# Patient Record
Sex: FEMALE | Race: WHITE
[De-identification: names, ages, dates, MRNs, and addresses within clinical notes are randomized per-mention and may not be internally consistent; named-entity substitution may affect disease eponyms.]

---

## 2020-07-07 ENCOUNTER — HOSPITAL ENCOUNTER (INPATIENT)
Dept: HOSPITAL 90 - EDH | Age: 80
LOS: 14 days | Discharge: HOSPICE | DRG: 853 | End: 2020-07-21
Attending: INTERNAL MEDICINE | Admitting: INTERNAL MEDICINE
Payer: MEDICARE

## 2020-07-07 VITALS — HEIGHT: 67 IN | BODY MASS INDEX: 36.96 KG/M2 | WEIGHT: 235.5 LBS

## 2020-07-07 DIAGNOSIS — E66.9: ICD-10-CM

## 2020-07-07 DIAGNOSIS — F02.81: ICD-10-CM
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
## 2020-07-07 DIAGNOSIS — I12.9: ICD-10-CM

## 2020-07-07 DIAGNOSIS — K59.00: ICD-10-CM

## 2020-07-07 DIAGNOSIS — Z87.891: ICD-10-CM

## 2020-07-07 DIAGNOSIS — Z90.710: ICD-10-CM

## 2020-07-07 DIAGNOSIS — N39.0: ICD-10-CM

## 2020-07-07 DIAGNOSIS — Z74.01: ICD-10-CM

## 2020-07-07 DIAGNOSIS — R65.20: ICD-10-CM

## 2020-07-07 DIAGNOSIS — E11.22: ICD-10-CM

## 2020-07-07 DIAGNOSIS — E86.0: ICD-10-CM

## 2020-07-07 DIAGNOSIS — K55.20: ICD-10-CM

## 2020-07-07 DIAGNOSIS — N18.9: ICD-10-CM

## 2020-07-07 DIAGNOSIS — S72.401A: ICD-10-CM

## 2020-07-07 DIAGNOSIS — K63.3: ICD-10-CM

## 2020-07-07 DIAGNOSIS — G93.41: ICD-10-CM

## 2020-07-07 DIAGNOSIS — R47.01: ICD-10-CM

## 2020-07-07 DIAGNOSIS — Z82.0: ICD-10-CM

## 2020-07-07 DIAGNOSIS — E87.6: ICD-10-CM

## 2020-07-07 DIAGNOSIS — Z88.8: ICD-10-CM

## 2020-07-07 DIAGNOSIS — Z16.24: ICD-10-CM

## 2020-07-07 DIAGNOSIS — Z20.828: ICD-10-CM

## 2020-07-07 DIAGNOSIS — E78.5: ICD-10-CM

## 2020-07-07 DIAGNOSIS — Z83.3: ICD-10-CM

## 2020-07-07 DIAGNOSIS — A41.51: Primary | ICD-10-CM

## 2020-07-07 DIAGNOSIS — D62: ICD-10-CM

## 2020-07-07 DIAGNOSIS — G30.9: ICD-10-CM

## 2020-07-07 DIAGNOSIS — R29.810: ICD-10-CM

## 2020-07-07 DIAGNOSIS — F02.80: ICD-10-CM
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
## 2020-07-07 DIAGNOSIS — G20: ICD-10-CM

## 2020-07-07 DIAGNOSIS — R13.10: ICD-10-CM

## 2020-07-07 DIAGNOSIS — E87.0: ICD-10-CM

## 2020-07-07 DIAGNOSIS — Z91.048: ICD-10-CM

## 2020-07-07 DIAGNOSIS — Z16.12: ICD-10-CM

## 2020-07-07 DIAGNOSIS — Z82.3: ICD-10-CM

## 2020-07-07 DIAGNOSIS — Z86.73: ICD-10-CM
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
## 2020-07-07 DIAGNOSIS — R53.81: ICD-10-CM

## 2020-07-07 DIAGNOSIS — J18.9: ICD-10-CM
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
## 2020-07-07 DIAGNOSIS — Z82.49: ICD-10-CM

## 2020-07-07 DIAGNOSIS — Z82.5: ICD-10-CM

## 2020-07-07 DIAGNOSIS — D64.9: ICD-10-CM

## 2020-07-07 LAB
ALBUMIN SERPL-MCNC: 3 G/DL (ref 3.5–5)
ALT SERPL-CCNC: 22 U/L (ref 12–78)
APTT PPP: 26.9 SEC (ref 26.3–35.5)
AST SERPL-CCNC: 20 U/L (ref 10–37)
BASOPHILS NFR BLD AUTO: 0.2 % (ref 0–5)
BILIRUB SERPL-MCNC: 0.4 MG/DL (ref 0.2–1)
BUN SERPL-MCNC: 30 MG/DL (ref 7–18)
CHLORIDE SERPL-SCNC: 112 MMOL/L (ref 101–111)
CO2 SERPL-SCNC: 34 MMOL/L (ref 21–32)
CREAT SERPL-MCNC: 1.2 MG/DL (ref 0.5–1.5)
EOSINOPHIL NFR BLD AUTO: 0 % (ref 0–8)
ERYTHROCYTE [DISTWIDTH] IN BLOOD BY AUTOMATED COUNT: 13.6 % (ref 11–15.5)
GFR SERPL CREATININE-BSD FRML MDRD: 46 ML/MIN (ref 60–?)
GLUCOSE SERPL-MCNC: 372 MG/DL (ref 70–105)
GLUCOSE UR STRIP-MCNC: 500 MG/DL
HCT VFR BLD AUTO: 27.7 % (ref 36–48)
INR PPP: 0.99 (ref 0.85–1.15)
LYMPHOCYTES NFR SPEC AUTO: 8 % (ref 21–51)
MCH RBC QN AUTO: 31.6 PG (ref 27–33)
MCHC RBC AUTO-ENTMCNC: 32.1 G/DL (ref 32–36)
MCV RBC AUTO: 98.2 FL (ref 79–99)
MONOCYTES NFR BLD AUTO: 9.7 % (ref 3–13)
NEUTROPHILS NFR BLD AUTO: 81.5 % (ref 40–77)
NRBC BLD MANUAL-RTO: 0 % (ref 0–0.19)
PH UR STRIP: 5.5 [PH] (ref 5–8)
PLATELET # BLD AUTO: 239 K/UL (ref 130–400)
POTASSIUM SERPL-SCNC: 3.4 MMOL/L (ref 3.5–5.1)
PROT SERPL-MCNC: 6.7 G/DL (ref 6–8.3)
PROT UR QL STRIP: (no result) MG/DL
PROTHROMBIN TIME: 10.7 SEC (ref 9.6–11.6)
RBC # BLD AUTO: 2.82 MIL/UL (ref 4–5.5)
RBC #/AREA URNS HPF: (no result) /HPF (ref 0–1)
SODIUM SERPL-SCNC: 153 MMOL/L (ref 136–145)
SP GR UR STRIP: 1.03 (ref 1–1.03)
UROBILINOGEN UR STRIP-MCNC: 1 MG/DL (ref 0.2–1)
WBC # BLD AUTO: 16 K/UL (ref 4.8–10.8)

## 2020-07-07 PROCEDURE — 85014 HEMATOCRIT: CPT

## 2020-07-07 PROCEDURE — 93356 MYOCRD STRAIN IMG SPCKL TRCK: CPT

## 2020-07-07 PROCEDURE — 92610 EVALUATE SWALLOWING FUNCTION: CPT

## 2020-07-07 PROCEDURE — 81001 URINALYSIS AUTO W/SCOPE: CPT

## 2020-07-07 PROCEDURE — 80202 ASSAY OF VANCOMYCIN: CPT

## 2020-07-07 PROCEDURE — 93306 TTE W/DOPPLER COMPLETE: CPT

## 2020-07-07 PROCEDURE — 86850 RBC ANTIBODY SCREEN: CPT

## 2020-07-07 PROCEDURE — 85027 COMPLETE CBC AUTOMATED: CPT

## 2020-07-07 PROCEDURE — 92526 ORAL FUNCTION THERAPY: CPT

## 2020-07-07 PROCEDURE — 86922 COMPATIBILITY TEST ANTIGLOB: CPT

## 2020-07-07 PROCEDURE — 80053 COMPREHEN METABOLIC PANEL: CPT

## 2020-07-07 PROCEDURE — 85610 PROTHROMBIN TIME: CPT

## 2020-07-07 PROCEDURE — 86900 BLOOD TYPING SEROLOGIC ABO: CPT

## 2020-07-07 PROCEDURE — 36415 COLL VENOUS BLD VENIPUNCTURE: CPT

## 2020-07-07 PROCEDURE — 73552 X-RAY EXAM OF FEMUR 2/>: CPT

## 2020-07-07 PROCEDURE — 87186 SC STD MICRODIL/AGAR DIL: CPT

## 2020-07-07 PROCEDURE — 93005 ELECTROCARDIOGRAM TRACING: CPT

## 2020-07-07 PROCEDURE — 87077 CULTURE AEROBIC IDENTIFY: CPT

## 2020-07-07 PROCEDURE — 85730 THROMBOPLASTIN TIME PARTIAL: CPT

## 2020-07-07 PROCEDURE — 82010 KETONE BODYS QUAN: CPT

## 2020-07-07 PROCEDURE — 86140 C-REACTIVE PROTEIN: CPT

## 2020-07-07 PROCEDURE — 36430 TRANSFUSION BLD/BLD COMPNT: CPT

## 2020-07-07 PROCEDURE — 85018 HEMOGLOBIN: CPT

## 2020-07-07 PROCEDURE — 73521 X-RAY EXAM HIPS BI 2 VIEWS: CPT

## 2020-07-07 PROCEDURE — 82270 OCCULT BLOOD FECES: CPT

## 2020-07-07 PROCEDURE — 80048 BASIC METABOLIC PNL TOTAL CA: CPT

## 2020-07-07 PROCEDURE — 87040 BLOOD CULTURE FOR BACTERIA: CPT

## 2020-07-07 PROCEDURE — 82948 REAGENT STRIP/BLOOD GLUCOSE: CPT

## 2020-07-07 PROCEDURE — 84145 PROCALCITONIN (PCT): CPT

## 2020-07-07 PROCEDURE — 85025 COMPLETE CBC W/AUTO DIFF WBC: CPT

## 2020-07-07 PROCEDURE — 73590 X-RAY EXAM OF LOWER LEG: CPT

## 2020-07-07 PROCEDURE — 71045 X-RAY EXAM CHEST 1 VIEW: CPT

## 2020-07-07 PROCEDURE — 83605 ASSAY OF LACTIC ACID: CPT

## 2020-07-07 PROCEDURE — 87088 URINE BACTERIA CULTURE: CPT

## 2020-07-07 PROCEDURE — 86901 BLOOD TYPING SEROLOGIC RH(D): CPT

## 2020-07-08 VITALS — DIASTOLIC BLOOD PRESSURE: 86 MMHG | SYSTOLIC BLOOD PRESSURE: 138 MMHG

## 2020-07-08 VITALS — DIASTOLIC BLOOD PRESSURE: 70 MMHG | SYSTOLIC BLOOD PRESSURE: 125 MMHG

## 2020-07-08 VITALS — DIASTOLIC BLOOD PRESSURE: 54 MMHG | SYSTOLIC BLOOD PRESSURE: 133 MMHG

## 2020-07-08 VITALS — DIASTOLIC BLOOD PRESSURE: 75 MMHG | SYSTOLIC BLOOD PRESSURE: 131 MMHG

## 2020-07-08 VITALS — DIASTOLIC BLOOD PRESSURE: 46 MMHG | SYSTOLIC BLOOD PRESSURE: 105 MMHG

## 2020-07-08 VITALS — SYSTOLIC BLOOD PRESSURE: 119 MMHG | DIASTOLIC BLOOD PRESSURE: 46 MMHG

## 2020-07-08 LAB
BASOPHILS NFR BLD AUTO: 0.2 % (ref 0–5)
BUN SERPL-MCNC: 27 MG/DL (ref 7–18)
CHLORIDE SERPL-SCNC: 116 MMOL/L (ref 101–111)
CO2 SERPL-SCNC: 31 MMOL/L (ref 21–32)
CREAT SERPL-MCNC: 1 MG/DL (ref 0.5–1.5)
EOSINOPHIL NFR BLD AUTO: 0 % (ref 0–8)
ERYTHROCYTE [DISTWIDTH] IN BLOOD BY AUTOMATED COUNT: 13.8 % (ref 11–15.5)
GFR SERPL CREATININE-BSD FRML MDRD: 56 ML/MIN (ref 60–?)
GLUCOSE SERPL-MCNC: 183 MG/DL (ref 70–105)
HCT VFR BLD AUTO: 22.8 % (ref 36–48)
LYMPHOCYTES NFR SPEC AUTO: 8.4 % (ref 21–51)
MCH RBC QN AUTO: 31.2 PG (ref 27–33)
MCHC RBC AUTO-ENTMCNC: 31.6 G/DL (ref 32–36)
MCV RBC AUTO: 98.7 FL (ref 79–99)
MONOCYTES NFR BLD AUTO: 11.6 % (ref 3–13)
NEUTROPHILS NFR BLD AUTO: 79.2 % (ref 40–77)
NRBC BLD MANUAL-RTO: 0 % (ref 0–0.19)
PLATELET # BLD AUTO: 194 K/UL (ref 130–400)
POTASSIUM SERPL-SCNC: 3 MMOL/L (ref 3.5–5.1)
RBC # BLD AUTO: 2.31 MIL/UL (ref 4–5.5)
SODIUM SERPL-SCNC: 155 MMOL/L (ref 136–145)
WBC # BLD AUTO: 19.5 K/UL (ref 4.8–10.8)

## 2020-07-08 RX ADMIN — PIPERACILLIN SODIUM AND TAZOBACTAM SODIUM SCH MLS/HR: .375; 3 INJECTION, POWDER, LYOPHILIZED, FOR SOLUTION INTRAVENOUS at 12:06

## 2020-07-08 RX ADMIN — POTASSIUM CHLORIDE PRN MLS/HR: 10 INJECTION, SOLUTION INTRAVENOUS at 12:06

## 2020-07-08 RX ADMIN — FAMOTIDINE SCH MG: 10 INJECTION INTRAVENOUS at 08:31

## 2020-07-08 RX ADMIN — Medication SCH MLS/HR: at 21:27

## 2020-07-08 RX ADMIN — MORPHINE SULFATE PRN MG: 2 INJECTION, SOLUTION INTRAMUSCULAR; INTRAVENOUS at 02:08

## 2020-07-08 RX ADMIN — SODIUM CHLORIDE SCH UNIT: 9 INJECTION, SOLUTION INTRAVENOUS at 17:24

## 2020-07-08 RX ADMIN — POTASSIUM CHLORIDE PRN MLS/HR: 10 INJECTION, SOLUTION INTRAVENOUS at 05:28

## 2020-07-08 RX ADMIN — PIPERACILLIN SODIUM AND TAZOBACTAM SODIUM SCH MLS/HR: .375; 3 INJECTION, POWDER, LYOPHILIZED, FOR SOLUTION INTRAVENOUS at 21:27

## 2020-07-08 RX ADMIN — SODIUM CHLORIDE SCH UNIT: 9 INJECTION, SOLUTION INTRAVENOUS at 11:56

## 2020-07-08 RX ADMIN — LIDOCAINE HYDROCHLORIDE PRN ML: 10 INJECTION, SOLUTION EPIDURAL; INFILTRATION; INTRACAUDAL; PERINEURAL at 12:07

## 2020-07-08 RX ADMIN — Medication SCH MLS/HR: at 02:07

## 2020-07-08 RX ADMIN — SODIUM CHLORIDE SCH UNIT: 9 INJECTION, SOLUTION INTRAVENOUS at 06:08

## 2020-07-08 RX ADMIN — SODIUM CHLORIDE SCH UNIT: 9 INJECTION, SOLUTION INTRAVENOUS at 00:00

## 2020-07-08 NOTE — NUR
[facility]

CM called pt spouse on facesheet, left voicemail to Farrukh Conte [phone], pending 
call back. Obtained info from previous admi records. Pt is bedbound/dependent, lives at home 
with spouse. Pt has a hospital bed w/air mattress, liss lift, walker, wheelchair, shower 
chair, active w/[facility] 3x/wk. Been to [facility] in the past. DC plan back to home vs SNF, 
pending spouse and pt to decide. CM to cont to follow up.

-------------------------------------------------------------------------------

Addendum: 07/08/20 at 1055 by ELIF TIRADO LVN 

-------------------------------------------------------------------------------

Amended: Links added.

## 2020-07-08 NOTE — NUR
FAMILY UPDATE

SANTIAGO PERSONAL SITTER AT BEDSIDE UPDATED WITH STATUS AND PLAN OF CARE, STATES SHE IS CONTACT 
WITH FAMILY, NO QUESTIONS AT THIS TIME.

## 2020-07-09 VITALS — SYSTOLIC BLOOD PRESSURE: 122 MMHG | DIASTOLIC BLOOD PRESSURE: 64 MMHG

## 2020-07-09 VITALS — DIASTOLIC BLOOD PRESSURE: 58 MMHG | SYSTOLIC BLOOD PRESSURE: 132 MMHG

## 2020-07-09 VITALS — SYSTOLIC BLOOD PRESSURE: 134 MMHG | DIASTOLIC BLOOD PRESSURE: 54 MMHG

## 2020-07-09 VITALS — DIASTOLIC BLOOD PRESSURE: 56 MMHG | SYSTOLIC BLOOD PRESSURE: 130 MMHG

## 2020-07-09 VITALS — SYSTOLIC BLOOD PRESSURE: 134 MMHG | DIASTOLIC BLOOD PRESSURE: 69 MMHG

## 2020-07-09 VITALS — SYSTOLIC BLOOD PRESSURE: 132 MMHG | DIASTOLIC BLOOD PRESSURE: 64 MMHG

## 2020-07-09 VITALS — SYSTOLIC BLOOD PRESSURE: 124 MMHG | DIASTOLIC BLOOD PRESSURE: 42 MMHG

## 2020-07-09 LAB
ALBUMIN SERPL-MCNC: 2.2 G/DL (ref 3.5–5)
ALT SERPL-CCNC: 18 U/L (ref 12–78)
AST SERPL-CCNC: 22 U/L (ref 10–37)
BASOPHILS NFR BLD AUTO: 0.1 % (ref 0–5)
BILIRUB SERPL-MCNC: 0.3 MG/DL (ref 0.2–1)
BUN SERPL-MCNC: 24 MG/DL (ref 7–18)
CHLORIDE SERPL-SCNC: 112 MMOL/L (ref 101–111)
CO2 SERPL-SCNC: 30 MMOL/L (ref 21–32)
CREAT SERPL-MCNC: 0.9 MG/DL (ref 0.5–1.5)
EOSINOPHIL NFR BLD AUTO: 0.4 % (ref 0–8)
ERYTHROCYTE [DISTWIDTH] IN BLOOD BY AUTOMATED COUNT: 13.8 % (ref 11–15.5)
GFR SERPL CREATININE-BSD FRML MDRD: 64 ML/MIN (ref 60–?)
GLUCOSE SERPL-MCNC: 330 MG/DL (ref 70–105)
HCT VFR BLD AUTO: 19.1 % (ref 36–48)
LYMPHOCYTES NFR SPEC AUTO: 7.9 % (ref 21–51)
MCH RBC QN AUTO: 31.3 PG (ref 27–33)
MCHC RBC AUTO-ENTMCNC: 31.4 G/DL (ref 32–36)
MCV RBC AUTO: 99.5 FL (ref 79–99)
MONOCYTES NFR BLD AUTO: 8.3 % (ref 3–13)
NEUTROPHILS NFR BLD AUTO: 82.2 % (ref 40–77)
NRBC BLD MANUAL-RTO: 0 % (ref 0–0.19)
PLATELET # BLD AUTO: 178 K/UL (ref 130–400)
POTASSIUM SERPL-SCNC: 3.6 MMOL/L (ref 3.5–5.1)
PROT SERPL-MCNC: 5.8 G/DL (ref 6–8.3)
RBC # BLD AUTO: 1.92 MIL/UL (ref 4–5.5)
SODIUM SERPL-SCNC: 150 MMOL/L (ref 136–145)
WBC # BLD AUTO: 15.2 K/UL (ref 4.8–10.8)

## 2020-07-09 RX ADMIN — MORPHINE SULFATE PRN MG: 2 INJECTION, SOLUTION INTRAMUSCULAR; INTRAVENOUS at 05:34

## 2020-07-09 RX ADMIN — PIPERACILLIN SODIUM AND TAZOBACTAM SODIUM SCH MLS/HR: .375; 3 INJECTION, POWDER, LYOPHILIZED, FOR SOLUTION INTRAVENOUS at 12:23

## 2020-07-09 RX ADMIN — SODIUM CHLORIDE SCH UNIT: 9 INJECTION, SOLUTION INTRAVENOUS at 07:56

## 2020-07-09 RX ADMIN — FAMOTIDINE SCH MG: 10 INJECTION INTRAVENOUS at 10:28

## 2020-07-09 RX ADMIN — SODIUM CHLORIDE SCH UNIT: 9 INJECTION, SOLUTION INTRAVENOUS at 00:00

## 2020-07-09 RX ADMIN — SODIUM CHLORIDE SCH UNIT: 9 INJECTION, SOLUTION INTRAVENOUS at 20:48

## 2020-07-09 RX ADMIN — PIPERACILLIN SODIUM AND TAZOBACTAM SODIUM SCH MLS/HR: .375; 3 INJECTION, POWDER, LYOPHILIZED, FOR SOLUTION INTRAVENOUS at 19:52

## 2020-07-09 RX ADMIN — SODIUM CHLORIDE SCH UNIT: 9 INJECTION, SOLUTION INTRAVENOUS at 12:24

## 2020-07-09 RX ADMIN — PIPERACILLIN SODIUM AND TAZOBACTAM SODIUM SCH MLS/HR: .375; 3 INJECTION, POWDER, LYOPHILIZED, FOR SOLUTION INTRAVENOUS at 05:01

## 2020-07-09 RX ADMIN — SODIUM CHLORIDE SCH UNIT: 9 INJECTION, SOLUTION INTRAVENOUS at 16:41

## 2020-07-09 NOTE — NUR
spoke with laurie dunn about patient's fever of 101.4, he ordered a suppository tynelol 
650 mg. after i gave it, her temperature was 100.5 and i call him again at 23:20. he ordered 
blood cultures x 2, 500 ml of sodium chloride bolus, chest x-ray stat, lactic acid and 
procalcitonin.

## 2020-07-09 NOTE — NUR
PATIENT UPDATE

Pt's hemoglobin dropped fr 7.2 last night to 6.7 this am, no bleeding noted externally. Lab 
asked to repeat the cbc, at the same time included the type and screen with the labs. Ingrid Hall NP called and was made aware about what's going on with the pt with orders for type 
and screen and Cardiology consult. Pt medicated once with morphine 2 mg slow iv push before 
she was given a bedbath. With expressive aphasia, with a 24 hr private sitter at the 
bedside. Pt on aspiration precautions, head of the bed up at 40 degrees, repositioned in the 
bed every 2 hrs ,rt leg with an immobilizer in place.

## 2020-07-10 VITALS — SYSTOLIC BLOOD PRESSURE: 123 MMHG | DIASTOLIC BLOOD PRESSURE: 63 MMHG

## 2020-07-10 VITALS — DIASTOLIC BLOOD PRESSURE: 71 MMHG | SYSTOLIC BLOOD PRESSURE: 128 MMHG

## 2020-07-10 VITALS — SYSTOLIC BLOOD PRESSURE: 137 MMHG | DIASTOLIC BLOOD PRESSURE: 53 MMHG

## 2020-07-10 VITALS — DIASTOLIC BLOOD PRESSURE: 59 MMHG | SYSTOLIC BLOOD PRESSURE: 119 MMHG

## 2020-07-10 VITALS — SYSTOLIC BLOOD PRESSURE: 129 MMHG | DIASTOLIC BLOOD PRESSURE: 68 MMHG

## 2020-07-10 LAB
ALBUMIN SERPL-MCNC: 2 G/DL (ref 3.5–5)
ALT SERPL-CCNC: 14 U/L (ref 12–78)
AST SERPL-CCNC: 16 U/L (ref 10–37)
BASOPHILS NFR BLD AUTO: 0.1 % (ref 0–5)
BILIRUB SERPL-MCNC: 0.6 MG/DL (ref 0.2–1)
BUN SERPL-MCNC: 20 MG/DL (ref 7–18)
CHLORIDE SERPL-SCNC: 114 MMOL/L (ref 101–111)
CO2 SERPL-SCNC: 30 MMOL/L (ref 21–32)
CREAT SERPL-MCNC: 0.9 MG/DL (ref 0.5–1.5)
EOSINOPHIL NFR BLD AUTO: 0.6 % (ref 0–8)
ERYTHROCYTE [DISTWIDTH] IN BLOOD BY AUTOMATED COUNT: 14.6 % (ref 11–15.5)
GFR SERPL CREATININE-BSD FRML MDRD: 64 ML/MIN (ref 60–?)
GLUCOSE SERPL-MCNC: 253 MG/DL (ref 70–105)
HCT VFR BLD AUTO: 23.7 % (ref 36–48)
LYMPHOCYTES NFR SPEC AUTO: 5.4 % (ref 21–51)
MCH RBC QN AUTO: 30.5 PG (ref 27–33)
MCHC RBC AUTO-ENTMCNC: 31.6 G/DL (ref 32–36)
MCV RBC AUTO: 96.3 FL (ref 79–99)
MONOCYTES NFR BLD AUTO: 6.5 % (ref 3–13)
NEUTROPHILS NFR BLD AUTO: 86.7 % (ref 40–77)
NRBC BLD MANUAL-RTO: 0 % (ref 0–0.19)
PLATELET # BLD AUTO: 190 K/UL (ref 130–400)
POTASSIUM SERPL-SCNC: 3.6 MMOL/L (ref 3.5–5.1)
PROT SERPL-MCNC: 5.9 G/DL (ref 6–8.3)
RBC # BLD AUTO: 2.46 MIL/UL (ref 4–5.5)
SODIUM SERPL-SCNC: 150 MMOL/L (ref 136–145)
WBC # BLD AUTO: 20.2 K/UL (ref 4.8–10.8)

## 2020-07-10 RX ADMIN — SODIUM CHLORIDE SCH UNIT: 9 INJECTION, SOLUTION INTRAVENOUS at 16:42

## 2020-07-10 RX ADMIN — SODIUM CHLORIDE SCH UNIT: 9 INJECTION, SOLUTION INTRAVENOUS at 05:58

## 2020-07-10 RX ADMIN — PIPERACILLIN SODIUM AND TAZOBACTAM SODIUM SCH MLS/HR: .375; 3 INJECTION, POWDER, LYOPHILIZED, FOR SOLUTION INTRAVENOUS at 01:55

## 2020-07-10 RX ADMIN — FAMOTIDINE SCH MG: 10 INJECTION INTRAVENOUS at 08:08

## 2020-07-10 RX ADMIN — PIPERACILLIN SODIUM AND TAZOBACTAM SODIUM SCH MLS/HR: .375; 3 INJECTION, POWDER, LYOPHILIZED, FOR SOLUTION INTRAVENOUS at 11:32

## 2020-07-10 RX ADMIN — PIPERACILLIN SODIUM AND TAZOBACTAM SODIUM SCH MLS/HR: .375; 3 INJECTION, POWDER, LYOPHILIZED, FOR SOLUTION INTRAVENOUS at 19:19

## 2020-07-10 RX ADMIN — SODIUM CHLORIDE SCH UNIT: 9 INJECTION, SOLUTION INTRAVENOUS at 20:48

## 2020-07-11 VITALS — SYSTOLIC BLOOD PRESSURE: 137 MMHG | DIASTOLIC BLOOD PRESSURE: 58 MMHG

## 2020-07-11 VITALS — SYSTOLIC BLOOD PRESSURE: 129 MMHG | DIASTOLIC BLOOD PRESSURE: 51 MMHG

## 2020-07-11 VITALS — DIASTOLIC BLOOD PRESSURE: 57 MMHG | SYSTOLIC BLOOD PRESSURE: 144 MMHG

## 2020-07-11 VITALS — DIASTOLIC BLOOD PRESSURE: 66 MMHG | SYSTOLIC BLOOD PRESSURE: 134 MMHG

## 2020-07-11 VITALS — SYSTOLIC BLOOD PRESSURE: 119 MMHG | DIASTOLIC BLOOD PRESSURE: 71 MMHG

## 2020-07-11 VITALS — DIASTOLIC BLOOD PRESSURE: 67 MMHG | SYSTOLIC BLOOD PRESSURE: 120 MMHG

## 2020-07-11 VITALS — SYSTOLIC BLOOD PRESSURE: 143 MMHG | DIASTOLIC BLOOD PRESSURE: 54 MMHG

## 2020-07-11 LAB
ALBUMIN SERPL-MCNC: 2 G/DL (ref 3.5–5)
ALT SERPL-CCNC: 15 U/L (ref 12–78)
AST SERPL-CCNC: 16 U/L (ref 10–37)
BASOPHILS NFR BLD AUTO: 0.1 % (ref 0–5)
BILIRUB SERPL-MCNC: 0.6 MG/DL (ref 0.2–1)
BUN SERPL-MCNC: 18 MG/DL (ref 7–18)
CHLORIDE SERPL-SCNC: 115 MMOL/L (ref 101–111)
CO2 SERPL-SCNC: 31 MMOL/L (ref 21–32)
CREAT SERPL-MCNC: 0.8 MG/DL (ref 0.5–1.5)
CRP SERPL HS-MCNC: 507.3 MG/L (ref 0–9)
EOSINOPHIL NFR BLD AUTO: 1.6 % (ref 0–8)
ERYTHROCYTE [DISTWIDTH] IN BLOOD BY AUTOMATED COUNT: 14.3 % (ref 11–15.5)
GFR SERPL CREATININE-BSD FRML MDRD: 73 ML/MIN (ref 60–?)
GLUCOSE SERPL-MCNC: 188 MG/DL (ref 70–105)
HCT VFR BLD AUTO: 24.5 % (ref 36–48)
LYMPHOCYTES NFR SPEC AUTO: 4.4 % (ref 21–51)
MCH RBC QN AUTO: 31.2 PG (ref 27–33)
MCHC RBC AUTO-ENTMCNC: 32.2 G/DL (ref 32–36)
MCV RBC AUTO: 96.8 FL (ref 79–99)
MONOCYTES NFR BLD AUTO: 7.8 % (ref 3–13)
NEUTROPHILS NFR BLD AUTO: 85.1 % (ref 40–77)
NRBC BLD MANUAL-RTO: 0.1 % (ref 0–0.19)
PLATELET # BLD AUTO: 228 K/UL (ref 130–400)
POTASSIUM SERPL-SCNC: 3.5 MMOL/L (ref 3.5–5.1)
PROT SERPL-MCNC: 6.2 G/DL (ref 6–8.3)
RBC # BLD AUTO: 2.53 MIL/UL (ref 4–5.5)
SODIUM SERPL-SCNC: 153 MMOL/L (ref 136–145)
WBC # BLD AUTO: 23.9 K/UL (ref 4.8–10.8)

## 2020-07-11 RX ADMIN — PIPERACILLIN SODIUM AND TAZOBACTAM SODIUM SCH MLS/HR: .375; 3 INJECTION, POWDER, LYOPHILIZED, FOR SOLUTION INTRAVENOUS at 19:46

## 2020-07-11 RX ADMIN — POTASSIUM CHLORIDE PRN MEQ: 1.5 SOLUTION ORAL at 18:32

## 2020-07-11 RX ADMIN — SODIUM CHLORIDE SCH UNIT: 9 INJECTION, SOLUTION INTRAVENOUS at 11:41

## 2020-07-11 RX ADMIN — FAMOTIDINE SCH MG: 10 INJECTION INTRAVENOUS at 09:07

## 2020-07-11 RX ADMIN — SODIUM CHLORIDE SCH UNIT: 9 INJECTION, SOLUTION INTRAVENOUS at 16:29

## 2020-07-11 RX ADMIN — SODIUM CHLORIDE SCH MLS/HR: 9 INJECTION, SOLUTION INTRAVENOUS at 13:17

## 2020-07-11 RX ADMIN — SODIUM CHLORIDE SCH UNIT: 9 INJECTION, SOLUTION INTRAVENOUS at 22:22

## 2020-07-11 RX ADMIN — SODIUM CHLORIDE SCH UNIT: 9 INJECTION, SOLUTION INTRAVENOUS at 05:54

## 2020-07-11 RX ADMIN — PIPERACILLIN SODIUM AND TAZOBACTAM SODIUM SCH MLS/HR: .375; 3 INJECTION, POWDER, LYOPHILIZED, FOR SOLUTION INTRAVENOUS at 03:27

## 2020-07-11 RX ADMIN — PIPERACILLIN SODIUM AND TAZOBACTAM SODIUM SCH MLS/HR: .375; 3 INJECTION, POWDER, LYOPHILIZED, FOR SOLUTION INTRAVENOUS at 11:40

## 2020-07-11 RX ADMIN — POTASSIUM CHLORIDE PRN MEQ: 1.5 SOLUTION ORAL at 09:16

## 2020-07-11 NOTE — NUR
Re: New IV Access



Cat RN successfully able to start aseptically a new access x 2 attempt to the left arm, 
failed then to the right breast area close to the nipple, gauge 22, blood re-started at this 
time, at 150 cc/hr via dial a flow. Requested private pay sitter at the bedside to make sure 
pt won't place her hand on top of the right breast as this is where the new IV access is 
located, agreed.

## 2020-07-11 NOTE — NUR
Re: Infiltrated IV Access



Maria D RN charge nurse from CCU brought Cat RN from ED all gear up stated wearing new 
gown, shield, mask,shoe cover as reported will be starting a new IV access on this case.

## 2020-07-11 NOTE — NUR
PM Assessment



Received pt with PRBC unit # [id number] in progress being infused via dial a flow, need 
to stop at this time site noted infiltrated. Attempt to find a new acces, unsuccessful 
together with another nurse Ava RN. Phoned arpan THIBODEAUX stated to asked CCU charge 
nurse Maria D for assistance & to use a vein finder. Phoned Maria D RUST & was made aware 
I need assistance in re- starting the IV access since I have a blood transfusion that need 
to be infuse & be finish by 2130. Per Maria D RUST stated she will be coming to assist.

## 2020-07-11 NOTE — NUR
Re: Last BM



Per private sitter at the bedside Aleah stated last BM on their record was 7/6/2020, attempt 
to give pt slowly Lactulose orally, given only 5cc when pt noted to constantly coughing, 
discontinued the Lactulose. I verified with the sitter if pt does cough when being fed, 
stated since pt got sick this has been happening.I inform the sitter that I will notify MD 
in AM of this issue as we might need to have a swallowing test to make sure pt won't be 
aspirating. Sitter instructed not to give the pt anything orally for now.

## 2020-07-12 VITALS — DIASTOLIC BLOOD PRESSURE: 73 MMHG | SYSTOLIC BLOOD PRESSURE: 139 MMHG

## 2020-07-12 VITALS — DIASTOLIC BLOOD PRESSURE: 71 MMHG | SYSTOLIC BLOOD PRESSURE: 140 MMHG

## 2020-07-12 VITALS — DIASTOLIC BLOOD PRESSURE: 48 MMHG | SYSTOLIC BLOOD PRESSURE: 154 MMHG

## 2020-07-12 VITALS — DIASTOLIC BLOOD PRESSURE: 76 MMHG | SYSTOLIC BLOOD PRESSURE: 137 MMHG

## 2020-07-12 VITALS — DIASTOLIC BLOOD PRESSURE: 44 MMHG | SYSTOLIC BLOOD PRESSURE: 142 MMHG

## 2020-07-12 VITALS — DIASTOLIC BLOOD PRESSURE: 70 MMHG | SYSTOLIC BLOOD PRESSURE: 125 MMHG

## 2020-07-12 LAB
ALBUMIN SERPL-MCNC: 1.8 G/DL (ref 3.5–5)
ALT SERPL-CCNC: 15 U/L (ref 12–78)
AST SERPL-CCNC: 18 U/L (ref 10–37)
BILIRUB SERPL-MCNC: 0.6 MG/DL (ref 0.2–1)
BUN SERPL-MCNC: 19 MG/DL (ref 7–18)
CHLORIDE SERPL-SCNC: 120 MMOL/L (ref 101–111)
CO2 SERPL-SCNC: 29 MMOL/L (ref 21–32)
CREAT SERPL-MCNC: 0.8 MG/DL (ref 0.5–1.5)
CRP SERPL HS-MCNC: 380.1 MG/L (ref 0–9)
ERYTHROCYTE [DISTWIDTH] IN BLOOD BY AUTOMATED COUNT: 15.9 % (ref 11–15.5)
GFR SERPL CREATININE-BSD FRML MDRD: 73 ML/MIN (ref 60–?)
GLUCOSE SERPL-MCNC: 199 MG/DL (ref 70–105)
HCT VFR BLD AUTO: 24.9 % (ref 36–48)
HCT VFR BLD AUTO: 26.3 % (ref 36–48)
INR PPP: 0.92 (ref 0.85–1.15)
MCH RBC QN AUTO: 29.7 PG (ref 27–33)
MCHC RBC AUTO-ENTMCNC: 30.9 G/DL (ref 32–36)
MCV RBC AUTO: 96.1 FL (ref 79–99)
NRBC BLD MANUAL-RTO: 0.2 % (ref 0–0.19)
PLATELET # BLD AUTO: 250 K/UL (ref 130–400)
POTASSIUM SERPL-SCNC: 3.5 MMOL/L (ref 3.5–5.1)
PROT SERPL-MCNC: 5.9 G/DL (ref 6–8.3)
PROTHROMBIN TIME: 10 SEC (ref 9.6–11.6)
RBC # BLD AUTO: 2.59 MIL/UL (ref 4–5.5)
SODIUM SERPL-SCNC: 157 MMOL/L (ref 136–145)
WBC # BLD AUTO: 18.2 K/UL (ref 4.8–10.8)

## 2020-07-12 RX ADMIN — SODIUM CHLORIDE SCH MLS/HR: 9 INJECTION, SOLUTION INTRAVENOUS at 00:21

## 2020-07-12 RX ADMIN — SODIUM CHLORIDE SCH UNIT: 9 INJECTION, SOLUTION INTRAVENOUS at 22:58

## 2020-07-12 RX ADMIN — FAMOTIDINE SCH MG: 10 INJECTION INTRAVENOUS at 09:00

## 2020-07-12 RX ADMIN — SODIUM CHLORIDE SCH UNIT: 9 INJECTION, SOLUTION INTRAVENOUS at 16:30

## 2020-07-12 RX ADMIN — SODIUM CHLORIDE SCH UNIT: 9 INJECTION, SOLUTION INTRAVENOUS at 06:27

## 2020-07-12 RX ADMIN — PIPERACILLIN SODIUM AND TAZOBACTAM SODIUM SCH MLS/HR: .375; 3 INJECTION, POWDER, LYOPHILIZED, FOR SOLUTION INTRAVENOUS at 12:52

## 2020-07-12 RX ADMIN — SODIUM CHLORIDE SCH MLS/HR: 9 INJECTION, SOLUTION INTRAVENOUS at 12:53

## 2020-07-12 RX ADMIN — PIPERACILLIN SODIUM AND TAZOBACTAM SODIUM SCH MLS/HR: .375; 3 INJECTION, POWDER, LYOPHILIZED, FOR SOLUTION INTRAVENOUS at 21:11

## 2020-07-12 RX ADMIN — SODIUM CHLORIDE SCH UNIT: 9 INJECTION, SOLUTION INTRAVENOUS at 11:58

## 2020-07-12 RX ADMIN — PIPERACILLIN SODIUM AND TAZOBACTAM SODIUM SCH MLS/HR: .375; 3 INJECTION, POWDER, LYOPHILIZED, FOR SOLUTION INTRAVENOUS at 03:41

## 2020-07-12 NOTE — NUR
UNABLE TO ACCESS LEFT ARM BASILIC, AND CEPHALIC VEINS AFTER MULTIPLE ATTEMPTS. PT VERY 
DIFFICULT IV ACCESS. RIGHT ARM NOT FAVORABLE BECAUSE PT HAS VERY LIMITED RANGE OF MOTION. 
WILL ASK FOR ANOTHER PICC NURSE TO ATTEMPT PICC TOMORROW.

## 2020-07-12 NOTE — NUR
PIV LINE ACCESS:



JALIL Underwood tried to insert PIV line #20 g aseptically to Rt Wrist but unsuccessful x1 
attempt. There's a good blood return but blew up when flushing done.

## 2020-07-12 NOTE — NUR
BOWEL MOVEMENT 

pt had a bowel movement x 1 today incontinent after suppository given  

specimen sent to lab for occult blood

## 2020-07-12 NOTE — NUR
FOLLOWED UP REGARDING PRBC TRANSFUSION:



Ordered to transfused 2 units of PTR==

-------------------------------------------------------------------------------

Addendum: 07/12/20 at 2018 by MICHEAL ONTIVEROS RN RN

-------------------------------------------------------------------------------

Ordered to Type and Screen, transfused 2 units of PRBC. To give 1 unit of bag first then to 
transfused second unit after  H and H was checked.

## 2020-07-12 NOTE — NUR
PICC LINE UNSUCCESSFUL 



Reported to hospitalist Gucci FNP that its impossible to give a unit of bag , since 
IV site located at the right side of the breast. Ordered to do H and H q6hrs x 3.

## 2020-07-12 NOTE — NUR
DC PLAN



CALLED PATIENT ROOM. PATIENT HAS A PRIVATE CAREGIVER AT BEDSIDE. SAID SHE HAD NOT BEEN TOLD 
FROM NURSING STAFF REGARDING THERAPY. SAID TO SPEAK TO NURSE TO SEE IF THAT IS EVEN A ORDER. 
PATIENT STILL PENDING  SURGERY. PER NURSING NOTE NO BM SINCE 7/6. MANDY WILL CONTINUE TO 
FOLLOW. 

-------------------------------------------------------------------------------

Addendum: 07/12/20 at 1214 by ORA MENDOZA RN CM

-------------------------------------------------------------------------------

Amended: Links added.

## 2020-07-13 VITALS — SYSTOLIC BLOOD PRESSURE: 149 MMHG | DIASTOLIC BLOOD PRESSURE: 78 MMHG

## 2020-07-13 VITALS — DIASTOLIC BLOOD PRESSURE: 88 MMHG | SYSTOLIC BLOOD PRESSURE: 145 MMHG

## 2020-07-13 VITALS — DIASTOLIC BLOOD PRESSURE: 99 MMHG | SYSTOLIC BLOOD PRESSURE: 132 MMHG

## 2020-07-13 VITALS — DIASTOLIC BLOOD PRESSURE: 62 MMHG | SYSTOLIC BLOOD PRESSURE: 131 MMHG

## 2020-07-13 LAB
ALBUMIN SERPL-MCNC: 1.7 G/DL (ref 3.5–5)
ALT SERPL-CCNC: 14 U/L (ref 12–78)
AST SERPL-CCNC: 17 U/L (ref 10–37)
BILIRUB SERPL-MCNC: 0.5 MG/DL (ref 0.2–1)
BUN SERPL-MCNC: 16 MG/DL (ref 7–18)
CHLORIDE SERPL-SCNC: 120 MMOL/L (ref 101–111)
CO2 SERPL-SCNC: 31 MMOL/L (ref 21–32)
CREAT SERPL-MCNC: 0.8 MG/DL (ref 0.5–1.5)
ERYTHROCYTE [DISTWIDTH] IN BLOOD BY AUTOMATED COUNT: 15.7 % (ref 11–15.5)
GFR SERPL CREATININE-BSD FRML MDRD: 73 ML/MIN (ref 60–?)
GLUCOSE SERPL-MCNC: 216 MG/DL (ref 70–105)
HCT VFR BLD AUTO: 24.5 % (ref 36–48)
HCT VFR BLD AUTO: 26.8 % (ref 36–48)
MCH RBC QN AUTO: 29.6 PG (ref 27–33)
MCHC RBC AUTO-ENTMCNC: 30.2 G/DL (ref 32–36)
MCV RBC AUTO: 98 FL (ref 79–99)
NRBC BLD MANUAL-RTO: 0.3 % (ref 0–0.19)
PLATELET # BLD AUTO: 252 K/UL (ref 130–400)
POTASSIUM SERPL-SCNC: 3.3 MMOL/L (ref 3.5–5.1)
PROT SERPL-MCNC: 5.7 G/DL (ref 6–8.3)
RBC # BLD AUTO: 2.5 MIL/UL (ref 4–5.5)
SODIUM SERPL-SCNC: 158 MMOL/L (ref 136–145)
WBC # BLD AUTO: 11.9 K/UL (ref 4.8–10.8)

## 2020-07-13 RX ADMIN — PIPERACILLIN SODIUM AND TAZOBACTAM SODIUM SCH MLS/HR: .375; 3 INJECTION, POWDER, LYOPHILIZED, FOR SOLUTION INTRAVENOUS at 05:43

## 2020-07-13 RX ADMIN — SODIUM CHLORIDE SCH UNIT: 9 INJECTION, SOLUTION INTRAVENOUS at 20:18

## 2020-07-13 RX ADMIN — FAMOTIDINE SCH MG: 10 INJECTION INTRAVENOUS at 09:00

## 2020-07-13 RX ADMIN — SODIUM CHLORIDE SCH UNIT: 9 INJECTION, SOLUTION INTRAVENOUS at 11:30

## 2020-07-13 RX ADMIN — SODIUM CHLORIDE SCH MLS/HR: 9 INJECTION, SOLUTION INTRAVENOUS at 01:01

## 2020-07-13 RX ADMIN — SODIUM CHLORIDE SCH MLS/HR: 9 INJECTION, SOLUTION INTRAVENOUS at 13:00

## 2020-07-13 RX ADMIN — SODIUM CHLORIDE SCH UNIT: 9 INJECTION, SOLUTION INTRAVENOUS at 06:44

## 2020-07-13 RX ADMIN — POTASSIUM CHLORIDE PRN MLS/HR: 10 INJECTION, SOLUTION INTRAVENOUS at 21:36

## 2020-07-13 RX ADMIN — SODIUM CHLORIDE SCH UNIT: 9 INJECTION, SOLUTION INTRAVENOUS at 16:30

## 2020-07-13 RX ADMIN — PIPERACILLIN SODIUM AND TAZOBACTAM SODIUM SCH MLS/HR: .375; 3 INJECTION, POWDER, LYOPHILIZED, FOR SOLUTION INTRAVENOUS at 13:10

## 2020-07-13 RX ADMIN — PIPERACILLIN SODIUM AND TAZOBACTAM SODIUM SCH MLS/HR: .375; 3 INJECTION, POWDER, LYOPHILIZED, FOR SOLUTION INTRAVENOUS at 20:20

## 2020-07-13 RX ADMIN — SODIUM CHLORIDE SCH MLS/HR: 9 INJECTION, SOLUTION INTRAVENOUS at 23:58

## 2020-07-13 RX ADMIN — LIDOCAINE HYDROCHLORIDE PRN ML: 10 INJECTION, SOLUTION EPIDURAL; INFILTRATION; INTRACAUDAL; PERINEURAL at 21:35

## 2020-07-13 NOTE — NUR
RD NOTIFICATION



Pt admitted with R-Femur Fracture, UTI. Pt with weakness and AMS, Alzheimer's. Pt tolerating 
75gm CC, Finely chopped diet order as per EMR. Poor PO intake. Pending PICC, pending 
transfusion. Gastroenterology consulted. WBC 11.9, Hgb 7.4, Na 158, , Alb 1.7. 



Recommend Glucerna TID, as medically feasible. 

Recommend 60mL ProMod BID, as medically feasible.



RD to continue to monitor. Please notify RD as additional nutrition concerns arise. Thank 
you.

## 2020-07-13 NOTE — NUR
called to picc line nurse regarding noticed leaking to midline . per picc line nurse aamir 
line is in place to just reinforce with a dressing and monitor . will cont to monitor

## 2020-07-13 NOTE — NUR
DYSPHAGIA EVAL COMPLETED. +S/S OF ASPIRATION. RECOMMEND SHORT-TERM ALTERNATE MEANS OF 
NUTRITION/HYDRATION.



RECOMMENDATIONS: 

DYSPHAGIA THERAPY 3-5X WEEK TO INCREASE ORAL MOTOR ROM AND PHARYNGEAL SWALLOW: 

LTG#1: Pt WILL TOLERATE LEAST RESTRICTIVE DIET TO MEET NUTRITION/HYDRATION WITH NO S/S OF 
ASPIRATION. 

LTG#2: SKILLED EDUCATION Pt/FAMILY/STAFF

STG#1: Pt WILL PARTICIPATE IN THERAPEUTIC TRIALS OF PUREED, NECTAR-THICK LIQUIDS WITH NO 
OVERT S/S OF ASPIRATION.

STG#2: PT WILL BE ABLE TO PARTICIPATE IN MBSS AFTER 2-4 WEEKS OF THERAPEUTIC INTERVENTION. 

STG#3: SKILLED EDUCATION Pt/FAMILY/STAFF.



[id number]

SLP SPOKE TO  DAMIÁN PA AND DAUGHTER BLAINE VUONG ON RESULTS AND RECOMMENDATIONS 
FOR ALTERNATE MEANS OF NUTRITION/HYDRATION. SLP EDUCATED FAMILY ON RISKS AND CONSEQUENCES OF 
ASPIRATION. SLP REVIEWED EFFECTS OF ALZHEIMER'S ON SWALLOWING FUNCTION (VERBALIZED MD HAD 
REVIEWED WITH THEM WHEN Pt WAS DIAGNOSED 15 YEARS AGO). THEY VERBALIZED THAT THEY WOULD 
PREFER QUALITY OF LIFE AT THIS POINT IN TIME. THEREFORE, THEY WOULD NOT WANT LONG-TERM 
ALTERNATE MEANS OF NUTRITION/HYDRATION. THEY ARE AGREEABLE TO SHORT-TERM ALTERNATE MEANS OF 
NUTRITION/HYDRATION WITH ATTEMPTS FOR P.O. THEY PLAN TO RESUME P.O. WHEN THEY GO HOME.

-------------------------------------------------------------------------------

Addendum: 07/13/20 at 1537 by MARIA C ELIAS [facility]

-------------------------------------------------------------------------------

Amended: Links added.

## 2020-07-14 VITALS — SYSTOLIC BLOOD PRESSURE: 112 MMHG | DIASTOLIC BLOOD PRESSURE: 34 MMHG

## 2020-07-14 VITALS — SYSTOLIC BLOOD PRESSURE: 160 MMHG | DIASTOLIC BLOOD PRESSURE: 71 MMHG

## 2020-07-14 VITALS — DIASTOLIC BLOOD PRESSURE: 88 MMHG | SYSTOLIC BLOOD PRESSURE: 158 MMHG

## 2020-07-14 VITALS — SYSTOLIC BLOOD PRESSURE: 145 MMHG | DIASTOLIC BLOOD PRESSURE: 66 MMHG

## 2020-07-14 VITALS — SYSTOLIC BLOOD PRESSURE: 168 MMHG | DIASTOLIC BLOOD PRESSURE: 45 MMHG

## 2020-07-14 VITALS — DIASTOLIC BLOOD PRESSURE: 76 MMHG | SYSTOLIC BLOOD PRESSURE: 159 MMHG

## 2020-07-14 LAB
ALBUMIN SERPL-MCNC: 1.8 G/DL (ref 3.5–5)
ALT SERPL-CCNC: 11 U/L (ref 12–78)
AST SERPL-CCNC: 15 U/L (ref 10–37)
BASOPHILS NFR BLD AUTO: 0.3 % (ref 0–5)
BILIRUB SERPL-MCNC: 0.6 MG/DL (ref 0.2–1)
BUN SERPL-MCNC: 15 MG/DL (ref 7–18)
CHLORIDE SERPL-SCNC: 118 MMOL/L (ref 101–111)
CO2 SERPL-SCNC: 33 MMOL/L (ref 21–32)
CREAT SERPL-MCNC: 0.9 MG/DL (ref 0.5–1.5)
EOSINOPHIL NFR BLD AUTO: 2 % (ref 0–8)
ERYTHROCYTE [DISTWIDTH] IN BLOOD BY AUTOMATED COUNT: 15.3 % (ref 11–15.5)
GFR SERPL CREATININE-BSD FRML MDRD: 64 ML/MIN (ref 60–?)
GLUCOSE SERPL-MCNC: 228 MG/DL (ref 70–105)
HCT VFR BLD AUTO: 24.6 % (ref 36–48)
HCT VFR BLD AUTO: 26.1 % (ref 36–48)
HCT VFR BLD AUTO: 26.7 % (ref 36–48)
INR PPP: 0.98 (ref 0.85–1.15)
LYMPHOCYTES NFR SPEC AUTO: 8.5 % (ref 21–51)
MCH RBC QN AUTO: 29.9 PG (ref 27–33)
MCHC RBC AUTO-ENTMCNC: 30.9 G/DL (ref 32–36)
MCV RBC AUTO: 96.9 FL (ref 79–99)
MONOCYTES NFR BLD AUTO: 9 % (ref 3–13)
NEUTROPHILS NFR BLD AUTO: 74.7 % (ref 40–77)
NRBC BLD MANUAL-RTO: 0.4 % (ref 0–0.19)
PLATELET # BLD AUTO: 273 K/UL (ref 130–400)
POTASSIUM SERPL-SCNC: 3.2 MMOL/L (ref 3.5–5.1)
PROT SERPL-MCNC: 5.9 G/DL (ref 6–8.3)
PROTHROMBIN TIME: 10.6 SEC (ref 9.6–11.6)
RBC # BLD AUTO: 2.54 MIL/UL (ref 4–5.5)
RBC MORPH BLD: (no result)
SODIUM SERPL-SCNC: 157 MMOL/L (ref 136–145)
WBC # BLD AUTO: 14 K/UL (ref 4.8–10.8)

## 2020-07-14 RX ADMIN — POTASSIUM CHLORIDE PRN MEQ: 1.5 SOLUTION ORAL at 20:01

## 2020-07-14 RX ADMIN — SODIUM CHLORIDE SCH GM: 9 INJECTION, SOLUTION INTRAVENOUS at 20:01

## 2020-07-14 RX ADMIN — ACETAMINOPHEN PRN MG: 650 SUPPOSITORY RECTAL at 20:47

## 2020-07-14 RX ADMIN — POTASSIUM CHLORIDE PRN MEQ: 1.5 SOLUTION ORAL at 04:26

## 2020-07-14 RX ADMIN — FAMOTIDINE SCH MG: 10 INJECTION INTRAVENOUS at 10:27

## 2020-07-14 RX ADMIN — SODIUM CHLORIDE SCH MLS/HR: 9 INJECTION, SOLUTION INTRAVENOUS at 18:03

## 2020-07-14 RX ADMIN — SODIUM CHLORIDE SCH UNIT: 9 INJECTION, SOLUTION INTRAVENOUS at 20:46

## 2020-07-14 RX ADMIN — SODIUM CHLORIDE SCH UNIT: 9 INJECTION, SOLUTION INTRAVENOUS at 06:19

## 2020-07-14 RX ADMIN — SODIUM CHLORIDE SCH UNIT: 9 INJECTION, SOLUTION INTRAVENOUS at 18:07

## 2020-07-14 RX ADMIN — POTASSIUM CHLORIDE PRN MEQ: 1.5 SOLUTION ORAL at 04:29

## 2020-07-14 RX ADMIN — PIPERACILLIN SODIUM AND TAZOBACTAM SODIUM SCH MLS/HR: .375; 3 INJECTION, POWDER, LYOPHILIZED, FOR SOLUTION INTRAVENOUS at 02:58

## 2020-07-14 RX ADMIN — Medication SCH MLS/HR: at 18:04

## 2020-07-14 NOTE — NUR
VANCO UP NOW, PENDING 1 UNIT OF BLOOD AND NEEDS POT. COVERAGE. REPORT PASSED ON TO PM NURSE. 
PHONE CONSENT OBTAINED FOR ORIF OF RT. FEMUR IN AM . IMMOBILIZER IN PLACE RT. LEG.

## 2020-07-14 NOTE — NUR
CRNA HERE TO PLACE CENTRAL LINE FOR ABX. THERAPY AND PENDING SURG 7/15/20.VERY DIFFICULTTO 
GET A LINE AND ENDED PLACING 18G TO LT. SIDE NECK

## 2020-07-14 NOTE — NUR
ORDER NOTED IN CHART FOR [facility] CATH PLACEMENT BY IR.  CLARIFIED ORDER WITH DR. BASIA TERRELL, WHO STATED ORDER SHOULD READ CENTRAL LINE PLACEMENT FOR IV ANTIBIOTICS AND FLUIDS.  
ORDER EDITED TO REFLECT CORRECT PROCEDURE.  CALL PLACED TO CATH LAB TO NOTIFY, NO ANSWER.  
MESSAGE SENT TO CATH  REQUESTING CALL BACK.

## 2020-07-14 NOTE — NUR
RD FOLLOW UP



Pt s/p ST eval;+S/S aspiration, short term altered means nutrition recommend.



Recommend continuous Tube Feeding, Vital AF 1.2 @goal of 50mls/hr (1440kcal, 90gm protein, 
973 free H2O).

Recommend Flush of 150mls Q4Hrs.



Recommendations faxed to 3rd floor, Pod A (ext. [phone]). RN notified.



NUTRITION NOTE:

WBC 14.0, Na 157, K 3.2 , Alb 1.8.

RD to continue to monitor.

## 2020-07-14 NOTE — NUR
REMOVED PATIENT'S MIDLINE ON LEFT UPPER ARM BECAUSE IT WAS LEAKING AND NONFUNCTIONING. 
CATHETER TIP INTACT. REMOVED IV CATHETER ON THE RIGHT BREAST AND CATHETER TIP INTACT. 
STARTED GIVING 1 UNIT OF BLOOD AT 21:35. NO ISSUES

## 2020-07-14 NOTE — NUR
patient was placed on feeding pump 20 ml/hr of vital af by day nurse. i flushed the tube at 
20:00 with 150ml of water. i change the feeding rate to 25 ml/hour  flushed it again at 
00:00 with 150 ml of water after i discontinued the feeding temporarily due to npo after 
midnight for procedure (orif of right femur). after the procedure, patient is to start at 25 
ml/hour feeding and flush every 4 hours with 150 ml of water. increase feeding every 5 hours 
until reach goal of 50 ml/hour

## 2020-07-14 NOTE — NUR
150ML OF WATER  GIVEN VIA NG. EARLIER DR. CORDERO GAVE OK TO NG FOR FEEDINGS.STARTED ON VITAL 
AF AT 20 ML/HR

## 2020-07-14 NOTE — NUR
patient's midline is leaking zosyn all over the bed and her gown as well as on the kerlex 
that i wrapped around her. spoke with house supervisor, lucien, about it and she will let 
the picc line nurse from the day know to reevaluate the midline in the am.

## 2020-07-14 NOTE — NUR
TREATMENT COMPLETED. 



Pt'S DAUGHTER BLAINE AT BEDSIDE AT THE TIME OF THE SESSION. Pt RAISED TO 90 DEGREES IN BED 
DURING THE SESSION. SLP PROVIDED ORAL CARE. Pt PARTICIPATED IN THERAPEUTIC TRIALS OF 
ADVANCED TRIALS OF THIN LIQUIDS VIA TSP X5 WITH IMPROVED LINGUAL LATERALIZATION. LABIAL SEAL 
IS WEAK WITH ANTERIOR SPILLAGE NOTED. Pt WITH TRIALS OF NECTAR-THICK LIQUIDS X2 WITH 
IMPROVED SWALLOW TRIGGER AND NO OVERT S/S OF ASPIRATION. Pt WITH +S.S OF ASPIRATION OF WEAK 
COUGH RESPONSE WITH THIN LIQUIDS VIA STRAW (INTAKE AT HOME WITH STRAW). Pt PARTICIPATED IN 
TRIALS OF PUREED TEXTURE X3 WITH NO OVERT S/S OF ASPIRATION. RESIDUE PRESENT IN TONGUE BODY 
WITH MULTIPLE SWALLOWS  PRESENT. SLP EDUCATED DAUGHTER ON RISKS AND CONSEQUENCES OF 
ASPIRATION. NG TUBE IN PLACE AT THIS TIME. SKILLED SPEECH THERAPY CONTINUED TO BE 
RECOMMENDED 3-5X WEEK. SLP COORDINATED CARE WITH NURSE NANCY.

-------------------------------------------------------------------------------

Addendum: 07/14/20 at 1433 by JAMISON SAUER ST

-------------------------------------------------------------------------------

Amended: Links added.

## 2020-07-15 VITALS — DIASTOLIC BLOOD PRESSURE: 61 MMHG | SYSTOLIC BLOOD PRESSURE: 126 MMHG

## 2020-07-15 VITALS — SYSTOLIC BLOOD PRESSURE: 131 MMHG | DIASTOLIC BLOOD PRESSURE: 59 MMHG

## 2020-07-15 VITALS — DIASTOLIC BLOOD PRESSURE: 71 MMHG | SYSTOLIC BLOOD PRESSURE: 145 MMHG

## 2020-07-15 VITALS — DIASTOLIC BLOOD PRESSURE: 58 MMHG | SYSTOLIC BLOOD PRESSURE: 131 MMHG

## 2020-07-15 VITALS — SYSTOLIC BLOOD PRESSURE: 136 MMHG | DIASTOLIC BLOOD PRESSURE: 62 MMHG

## 2020-07-15 VITALS — DIASTOLIC BLOOD PRESSURE: 59 MMHG | SYSTOLIC BLOOD PRESSURE: 149 MMHG

## 2020-07-15 VITALS — DIASTOLIC BLOOD PRESSURE: 54 MMHG | SYSTOLIC BLOOD PRESSURE: 134 MMHG

## 2020-07-15 VITALS — DIASTOLIC BLOOD PRESSURE: 65 MMHG | SYSTOLIC BLOOD PRESSURE: 119 MMHG

## 2020-07-15 VITALS — SYSTOLIC BLOOD PRESSURE: 148 MMHG | DIASTOLIC BLOOD PRESSURE: 69 MMHG

## 2020-07-15 VITALS — DIASTOLIC BLOOD PRESSURE: 91 MMHG | SYSTOLIC BLOOD PRESSURE: 144 MMHG

## 2020-07-15 VITALS — DIASTOLIC BLOOD PRESSURE: 74 MMHG | SYSTOLIC BLOOD PRESSURE: 135 MMHG

## 2020-07-15 VITALS — SYSTOLIC BLOOD PRESSURE: 152 MMHG | DIASTOLIC BLOOD PRESSURE: 59 MMHG

## 2020-07-15 VITALS — SYSTOLIC BLOOD PRESSURE: 135 MMHG | DIASTOLIC BLOOD PRESSURE: 96 MMHG

## 2020-07-15 VITALS — SYSTOLIC BLOOD PRESSURE: 121 MMHG | DIASTOLIC BLOOD PRESSURE: 59 MMHG

## 2020-07-15 VITALS — SYSTOLIC BLOOD PRESSURE: 176 MMHG | DIASTOLIC BLOOD PRESSURE: 62 MMHG

## 2020-07-15 VITALS — DIASTOLIC BLOOD PRESSURE: 56 MMHG | SYSTOLIC BLOOD PRESSURE: 132 MMHG

## 2020-07-15 VITALS — DIASTOLIC BLOOD PRESSURE: 52 MMHG | SYSTOLIC BLOOD PRESSURE: 132 MMHG

## 2020-07-15 VITALS — DIASTOLIC BLOOD PRESSURE: 75 MMHG | SYSTOLIC BLOOD PRESSURE: 143 MMHG

## 2020-07-15 VITALS — SYSTOLIC BLOOD PRESSURE: 149 MMHG | DIASTOLIC BLOOD PRESSURE: 75 MMHG

## 2020-07-15 VITALS — DIASTOLIC BLOOD PRESSURE: 53 MMHG | SYSTOLIC BLOOD PRESSURE: 136 MMHG

## 2020-07-15 VITALS — DIASTOLIC BLOOD PRESSURE: 62 MMHG | SYSTOLIC BLOOD PRESSURE: 150 MMHG

## 2020-07-15 VITALS — SYSTOLIC BLOOD PRESSURE: 143 MMHG | DIASTOLIC BLOOD PRESSURE: 61 MMHG

## 2020-07-15 LAB
ALBUMIN SERPL-MCNC: 1.7 G/DL (ref 3.5–5)
ALT SERPL-CCNC: 14 U/L (ref 12–78)
AST SERPL-CCNC: 17 U/L (ref 10–37)
BILIRUB SERPL-MCNC: 0.8 MG/DL (ref 0.2–1)
BUN SERPL-MCNC: 15 MG/DL (ref 7–18)
CHLORIDE SERPL-SCNC: 117 MMOL/L (ref 101–111)
CO2 SERPL-SCNC: 33 MMOL/L (ref 21–32)
CREAT SERPL-MCNC: 0.8 MG/DL (ref 0.5–1.5)
ERYTHROCYTE [DISTWIDTH] IN BLOOD BY AUTOMATED COUNT: 16.3 % (ref 11–15.5)
GFR SERPL CREATININE-BSD FRML MDRD: 73 ML/MIN (ref 60–?)
GLUCOSE SERPL-MCNC: 248 MG/DL (ref 70–105)
HCT VFR BLD AUTO: 29.5 % (ref 36–48)
MCH RBC QN AUTO: 30 PG (ref 27–33)
MCHC RBC AUTO-ENTMCNC: 31.9 G/DL (ref 32–36)
MCV RBC AUTO: 94.2 FL (ref 79–99)
NRBC BLD MANUAL-RTO: 0.7 % (ref 0–0.19)
PLATELET # BLD AUTO: 260 K/UL (ref 130–400)
POTASSIUM SERPL-SCNC: 4 MMOL/L (ref 3.5–5.1)
PROT SERPL-MCNC: 5.7 G/DL (ref 6–8.3)
RBC # BLD AUTO: 3.13 MIL/UL (ref 4–5.5)
SODIUM SERPL-SCNC: 155 MMOL/L (ref 136–145)
WBC # BLD AUTO: 16 K/UL (ref 4.8–10.8)

## 2020-07-15 PROCEDURE — 0QSB04Z REPOSITION RIGHT LOWER FEMUR WITH INTERNAL FIXATION DEVICE, OPEN APPROACH: ICD-10-PCS | Performed by: ORTHOPAEDIC SURGERY

## 2020-07-15 PROCEDURE — 30233N1 TRANSFUSION OF NONAUTOLOGOUS RED BLOOD CELLS INTO PERIPHERAL VEIN, PERCUTANEOUS APPROACH: ICD-10-PCS | Performed by: ORTHOPAEDIC SURGERY

## 2020-07-15 RX ADMIN — SODIUM CHLORIDE SCH GM: 9 INJECTION, SOLUTION INTRAVENOUS at 02:49

## 2020-07-15 RX ADMIN — POTASSIUM CHLORIDE PRN MEQ: 1.5 SOLUTION ORAL at 00:09

## 2020-07-15 RX ADMIN — SODIUM CHLORIDE SCH UNIT: 9 INJECTION, SOLUTION INTRAVENOUS at 16:30

## 2020-07-15 RX ADMIN — SODIUM CHLORIDE SCH MLS/HR: 9 INJECTION, SOLUTION INTRAVENOUS at 00:09

## 2020-07-15 RX ADMIN — FAMOTIDINE SCH MG: 10 INJECTION INTRAVENOUS at 09:09

## 2020-07-15 RX ADMIN — Medication SCH MLS/HR: at 12:00

## 2020-07-15 RX ADMIN — SODIUM CHLORIDE SCH UNIT: 9 INJECTION, SOLUTION INTRAVENOUS at 11:30

## 2020-07-15 RX ADMIN — SODIUM CHLORIDE SCH UNIT: 9 INJECTION, SOLUTION INTRAVENOUS at 04:44

## 2020-07-15 RX ADMIN — SODIUM CHLORIDE SCH MLS/HR: 9 INJECTION, SOLUTION INTRAVENOUS at 12:00

## 2020-07-15 RX ADMIN — SODIUM CHLORIDE SCH GM: 9 INJECTION, SOLUTION INTRAVENOUS at 23:07

## 2020-07-15 RX ADMIN — SODIUM CHLORIDE SCH GM: 9 INJECTION, SOLUTION INTRAVENOUS at 11:17

## 2020-07-15 NOTE — NUR
HOLD TREATMENT



Pt OUT OF ROOM FOR SURGERY. SLP WILL FOLLOW UP TOMORROW. 

-------------------------------------------------------------------------------

Addendum: 07/15/20 at 1153 by MARIA C ELIAS [facility] ST

-------------------------------------------------------------------------------

Amended: Links added.

## 2020-07-16 VITALS — SYSTOLIC BLOOD PRESSURE: 139 MMHG | DIASTOLIC BLOOD PRESSURE: 60 MMHG

## 2020-07-16 VITALS — DIASTOLIC BLOOD PRESSURE: 63 MMHG | SYSTOLIC BLOOD PRESSURE: 135 MMHG

## 2020-07-16 VITALS — SYSTOLIC BLOOD PRESSURE: 134 MMHG | DIASTOLIC BLOOD PRESSURE: 57 MMHG

## 2020-07-16 VITALS — SYSTOLIC BLOOD PRESSURE: 139 MMHG | DIASTOLIC BLOOD PRESSURE: 54 MMHG

## 2020-07-16 VITALS — SYSTOLIC BLOOD PRESSURE: 141 MMHG | DIASTOLIC BLOOD PRESSURE: 62 MMHG

## 2020-07-16 VITALS — DIASTOLIC BLOOD PRESSURE: 59 MMHG | SYSTOLIC BLOOD PRESSURE: 134 MMHG

## 2020-07-16 LAB
BUN SERPL-MCNC: 14 MG/DL (ref 7–18)
CHLORIDE SERPL-SCNC: 111 MMOL/L (ref 101–111)
CO2 SERPL-SCNC: 30 MMOL/L (ref 21–32)
CREAT SERPL-MCNC: 1 MG/DL (ref 0.5–1.5)
ERYTHROCYTE [DISTWIDTH] IN BLOOD BY AUTOMATED COUNT: 16.2 % (ref 11–15.5)
GFR SERPL CREATININE-BSD FRML MDRD: 56 ML/MIN (ref 60–?)
GLUCOSE SERPL-MCNC: 353 MG/DL (ref 70–105)
HCT VFR BLD AUTO: 29.3 % (ref 36–48)
MCH RBC QN AUTO: 30.1 PG (ref 27–33)
MCHC RBC AUTO-ENTMCNC: 31.4 G/DL (ref 32–36)
MCV RBC AUTO: 95.8 FL (ref 79–99)
NRBC BLD MANUAL-RTO: 0.3 % (ref 0–0.19)
PLATELET # BLD AUTO: 290 K/UL (ref 130–400)
POTASSIUM SERPL-SCNC: 3.4 MMOL/L (ref 3.5–5.1)
RBC # BLD AUTO: 3.06 MIL/UL (ref 4–5.5)
SODIUM SERPL-SCNC: 148 MMOL/L (ref 136–145)
WBC # BLD AUTO: 17.5 K/UL (ref 4.8–10.8)

## 2020-07-16 RX ADMIN — Medication SCH MLS/HR: at 01:39

## 2020-07-16 RX ADMIN — SODIUM CHLORIDE SCH UNIT: 9 INJECTION, SOLUTION INTRAVENOUS at 06:34

## 2020-07-16 RX ADMIN — FAMOTIDINE SCH MG: 10 INJECTION INTRAVENOUS at 10:43

## 2020-07-16 RX ADMIN — SODIUM CHLORIDE SCH GM: 9 INJECTION, SOLUTION INTRAVENOUS at 14:13

## 2020-07-16 RX ADMIN — SODIUM CHLORIDE SCH UNIT: 9 INJECTION, SOLUTION INTRAVENOUS at 01:36

## 2020-07-16 RX ADMIN — ACETAMINOPHEN PRN MG: 650 SUPPOSITORY RECTAL at 14:18

## 2020-07-16 RX ADMIN — VANCOMYCIN HYDROCHLORIDE SCH MLS/HR: 1 INJECTION, POWDER, LYOPHILIZED, FOR SOLUTION INTRAVENOUS at 22:26

## 2020-07-16 RX ADMIN — SODIUM CHLORIDE SCH GM: 9 INJECTION, SOLUTION INTRAVENOUS at 22:26

## 2020-07-16 RX ADMIN — VANCOMYCIN HYDROCHLORIDE SCH MLS/HR: 1 INJECTION, POWDER, LYOPHILIZED, FOR SOLUTION INTRAVENOUS at 10:43

## 2020-07-16 RX ADMIN — SODIUM CHLORIDE SCH UNIT: 9 INJECTION, SOLUTION INTRAVENOUS at 17:05

## 2020-07-16 RX ADMIN — SODIUM CHLORIDE SCH UNIT: 9 INJECTION, SOLUTION INTRAVENOUS at 11:30

## 2020-07-16 RX ADMIN — SODIUM CHLORIDE SCH GM: 9 INJECTION, SOLUTION INTRAVENOUS at 06:31

## 2020-07-16 NOTE — NUR
RD FOLLOW UP 



Pt tolerating tube feeding, Vital AF 1.2 @35mls/hr (Low rate tube feeding). Pt with elevated 
BG (353).

S/p procedure. S/p SLP re-eval. Longterm altered means nutrition recommended, however Pt 
family refusing. 

WBC 17.5, K 3.4, GFR 56, , Alb 1.7.



Recommend continue low rate tube feeding at this time, as medically feasible.

RD to continue to monitor. Please notify as additional nutrition concerns arise. Thank you.

## 2020-07-17 VITALS — DIASTOLIC BLOOD PRESSURE: 60 MMHG | SYSTOLIC BLOOD PRESSURE: 107 MMHG

## 2020-07-17 VITALS — DIASTOLIC BLOOD PRESSURE: 71 MMHG | SYSTOLIC BLOOD PRESSURE: 110 MMHG

## 2020-07-17 VITALS — SYSTOLIC BLOOD PRESSURE: 120 MMHG | DIASTOLIC BLOOD PRESSURE: 69 MMHG

## 2020-07-17 VITALS — DIASTOLIC BLOOD PRESSURE: 112 MMHG | SYSTOLIC BLOOD PRESSURE: 140 MMHG

## 2020-07-17 VITALS — DIASTOLIC BLOOD PRESSURE: 68 MMHG | SYSTOLIC BLOOD PRESSURE: 144 MMHG

## 2020-07-17 VITALS — DIASTOLIC BLOOD PRESSURE: 60 MMHG | SYSTOLIC BLOOD PRESSURE: 143 MMHG

## 2020-07-17 VITALS — DIASTOLIC BLOOD PRESSURE: 66 MMHG | SYSTOLIC BLOOD PRESSURE: 138 MMHG

## 2020-07-17 RX ADMIN — Medication SCH MLS/HR: at 05:54

## 2020-07-17 RX ADMIN — VANCOMYCIN HYDROCHLORIDE SCH MLS/HR: 1 INJECTION, POWDER, LYOPHILIZED, FOR SOLUTION INTRAVENOUS at 21:51

## 2020-07-17 RX ADMIN — INSULIN GLARGINE SCH UNITS: 100 INJECTION, SOLUTION SUBCUTANEOUS at 01:20

## 2020-07-17 RX ADMIN — INSULIN GLARGINE SCH UNITS: 100 INJECTION, SOLUTION SUBCUTANEOUS at 11:01

## 2020-07-17 RX ADMIN — SODIUM CHLORIDE SCH UNIT: 9 INJECTION, SOLUTION INTRAVENOUS at 01:19

## 2020-07-17 RX ADMIN — INSULIN GLARGINE SCH UNITS: 100 INJECTION, SOLUTION SUBCUTANEOUS at 21:54

## 2020-07-17 RX ADMIN — SODIUM CHLORIDE SCH UNIT: 9 INJECTION, SOLUTION INTRAVENOUS at 16:19

## 2020-07-17 RX ADMIN — SODIUM CHLORIDE SCH UNIT: 9 INJECTION, SOLUTION INTRAVENOUS at 21:52

## 2020-07-17 RX ADMIN — FAMOTIDINE SCH MG: 10 INJECTION INTRAVENOUS at 10:59

## 2020-07-17 RX ADMIN — VANCOMYCIN HYDROCHLORIDE SCH MLS/HR: 1 INJECTION, POWDER, LYOPHILIZED, FOR SOLUTION INTRAVENOUS at 12:45

## 2020-07-17 RX ADMIN — SODIUM CHLORIDE SCH GM: 9 INJECTION, SOLUTION INTRAVENOUS at 21:51

## 2020-07-17 RX ADMIN — SODIUM CHLORIDE SCH UNIT: 9 INJECTION, SOLUTION INTRAVENOUS at 12:46

## 2020-07-17 RX ADMIN — SODIUM CHLORIDE SCH GM: 9 INJECTION, SOLUTION INTRAVENOUS at 05:54

## 2020-07-17 RX ADMIN — SODIUM CHLORIDE SCH GM: 9 INJECTION, SOLUTION INTRAVENOUS at 12:45

## 2020-07-17 RX ADMIN — SODIUM CHLORIDE SCH UNIT: 9 INJECTION, SOLUTION INTRAVENOUS at 06:53

## 2020-07-17 RX ADMIN — POTASSIUM CHLORIDE PRN MEQ: 1.5 SOLUTION ORAL at 01:21

## 2020-07-17 NOTE — NUR
TREATMENT COMPLETED. 



Pt'S DAUGHTER BLAINE AT BEDSIDE AT THE TIME OF THE SESSION. Pt RAISED TO 90 DEGREES IN BED 
DURING THE SESSION. SLP PROVIDED ORAL CARE. Pt PARTICIPATED IN THERAPEUTIC TRIALS OF 
ADVANCED TRIALS OF THIN LIQUIDS VIA TSP X2 WITH DECREASED BOLUS CONTROL WITH POOR LABIAL 
SEAL; ANTERIOR SPILLAGE NOTED. Pt WITH TRIALS OF NECTAR-THICK LIQUIDS X2 WITH IMPROVED 
SWALLOW TRIGGER AND NO OVERT S/S OF ASPIRATION.  Pt PARTICIPATED IN TRIALS OF PUREED TEXTURE 
X1 WITH POOR BOLUS FORMATION AND MANIPULATION. BOLUS IN TONGUE BODY AND FLOOR OF MOUTH 
REQUIRING REMOVAL WITH SUCTION.  SLP EDUCATED DAUGHTER ON RISKS AND CONSEQUENCES OF 
ASPIRATION. NG TUBE IN PLACE AT THIS TIME. SKILLED SPEECH THERAPY CONTINUED TO BE 
RECOMMENDED 3-5X WEEK. SLP COORDINATED CARE WITH NURSE GHASSAN. DAUGHTER REPORTS THAT SHE 
CONTINUES TO DE INCLINED TO NO PROCEED WITH PEG TUBE. PLAN IS TO REMOVE NG TUBE PRIOR TO D/C 
HOME AND PROVIDE P.O. IN THE HOME. SLP REITERATED HIGH RISK FOR ASPIRATION. SHE VERBALIZED 
UNDERSTANDING. 

-------------------------------------------------------------------------------

Addendum: 07/17/20 at 1443 by JAMISON SAUER ST

-------------------------------------------------------------------------------

Amended: Links added.

## 2020-07-18 VITALS — SYSTOLIC BLOOD PRESSURE: 118 MMHG | DIASTOLIC BLOOD PRESSURE: 74 MMHG

## 2020-07-18 VITALS — SYSTOLIC BLOOD PRESSURE: 130 MMHG | DIASTOLIC BLOOD PRESSURE: 62 MMHG

## 2020-07-18 VITALS — DIASTOLIC BLOOD PRESSURE: 68 MMHG | SYSTOLIC BLOOD PRESSURE: 126 MMHG

## 2020-07-18 VITALS — SYSTOLIC BLOOD PRESSURE: 132 MMHG | DIASTOLIC BLOOD PRESSURE: 64 MMHG

## 2020-07-18 VITALS — DIASTOLIC BLOOD PRESSURE: 66 MMHG | SYSTOLIC BLOOD PRESSURE: 137 MMHG

## 2020-07-18 LAB
BUN SERPL-MCNC: 14 MG/DL (ref 7–18)
CHLORIDE SERPL-SCNC: 107 MMOL/L (ref 101–111)
CO2 SERPL-SCNC: 30 MMOL/L (ref 21–32)
CREAT SERPL-MCNC: 0.7 MG/DL (ref 0.5–1.5)
ERYTHROCYTE [DISTWIDTH] IN BLOOD BY AUTOMATED COUNT: 15.4 % (ref 11–15.5)
GFR SERPL CREATININE-BSD FRML MDRD: 85 ML/MIN (ref 60–?)
GLUCOSE SERPL-MCNC: 247 MG/DL (ref 70–105)
HCT VFR BLD AUTO: 25.4 % (ref 36–48)
MCH RBC QN AUTO: 29.6 PG (ref 27–33)
MCHC RBC AUTO-ENTMCNC: 31.5 G/DL (ref 32–36)
MCV RBC AUTO: 94.1 FL (ref 79–99)
NRBC BLD MANUAL-RTO: 0.3 % (ref 0–0.19)
PLATELET # BLD AUTO: 275 K/UL (ref 130–400)
POTASSIUM SERPL-SCNC: 3.2 MMOL/L (ref 3.5–5.1)
RBC # BLD AUTO: 2.7 MIL/UL (ref 4–5.5)
SODIUM SERPL-SCNC: 141 MMOL/L (ref 136–145)
WBC # BLD AUTO: 14.1 K/UL (ref 4.8–10.8)

## 2020-07-18 RX ADMIN — VANCOMYCIN HYDROCHLORIDE SCH MLS/HR: 1 INJECTION, POWDER, LYOPHILIZED, FOR SOLUTION INTRAVENOUS at 20:51

## 2020-07-18 RX ADMIN — INSULIN GLARGINE SCH UNITS: 100 INJECTION, SOLUTION SUBCUTANEOUS at 21:11

## 2020-07-18 RX ADMIN — SODIUM CHLORIDE SCH UNIT: 9 INJECTION, SOLUTION INTRAVENOUS at 12:16

## 2020-07-18 RX ADMIN — VANCOMYCIN HYDROCHLORIDE SCH MLS/HR: 1 INJECTION, POWDER, LYOPHILIZED, FOR SOLUTION INTRAVENOUS at 09:16

## 2020-07-18 RX ADMIN — POTASSIUM CHLORIDE PRN MEQ: 1.5 SOLUTION ORAL at 09:17

## 2020-07-18 RX ADMIN — Medication SCH MLS/HR: at 09:21

## 2020-07-18 RX ADMIN — SODIUM CHLORIDE SCH UNIT: 9 INJECTION, SOLUTION INTRAVENOUS at 16:58

## 2020-07-18 RX ADMIN — Medication SCH MLS/HR: at 17:15

## 2020-07-18 RX ADMIN — FAMOTIDINE SCH MG: 10 INJECTION INTRAVENOUS at 09:16

## 2020-07-18 RX ADMIN — SODIUM CHLORIDE SCH GM: 9 INJECTION, SOLUTION INTRAVENOUS at 12:05

## 2020-07-18 RX ADMIN — SODIUM CHLORIDE SCH GM: 9 INJECTION, SOLUTION INTRAVENOUS at 20:52

## 2020-07-18 RX ADMIN — INSULIN GLARGINE SCH UNITS: 100 INJECTION, SOLUTION SUBCUTANEOUS at 09:19

## 2020-07-18 RX ADMIN — SODIUM CHLORIDE SCH UNIT: 9 INJECTION, SOLUTION INTRAVENOUS at 06:45

## 2020-07-18 RX ADMIN — POTASSIUM CHLORIDE PRN MEQ: 1.5 SOLUTION ORAL at 06:43

## 2020-07-18 RX ADMIN — SODIUM CHLORIDE SCH UNIT: 9 INJECTION, SOLUTION INTRAVENOUS at 20:50

## 2020-07-18 RX ADMIN — POTASSIUM CHLORIDE PRN MEQ: 1.5 SOLUTION ORAL at 12:04

## 2020-07-18 RX ADMIN — SODIUM CHLORIDE SCH GM: 9 INJECTION, SOLUTION INTRAVENOUS at 05:41

## 2020-07-18 NOTE — NUR
DYSPHAGIA TREATMENT COMPLETED



CARE PROVIDER PRESENT DURING TREATMENT. SLP PROVIDED ORAL CARE AND RAISED PATIENT TO 90 
DEGREE TO INITIATE P.O. TRIALS. Pt WITH OPEN MOUTH POSTURE AND NOT FOLLOWING ONE STEP 
COMMANDS. TRIALS OF THIN LIQUIDS VIA TSP X 2  WITH BOLUS LOSS IN ORAL CAVITY, DECREASED 
INTRA ORAL SENSATION, AND DELAYED SWALLOW RESPONSE. POSITIVE S/S OF ASPIRATION WITH THIN 
LIQUIDS WERE NOTED OF WET VOCAL QUALITY AND DIMINISHED COUGH, UNABLE TO CLEAR RESIDUE WITHIN 
PHARYNX. TRIALS OF NECTAR THICK LIQUIDS X2 WITH DECREASED PHARYNGEAL RESPONSE TRIGGER AND 
CONTINUED +S/S OF ASPIRATION OF WET VOCAL QUALITY. SLP SPOKE TO DAUGHTERBLAINE, OVER THE 
PHONE TO EDUCATE AND INFORM PATIENT AND FAMILY OF RISKS AND CONSEQUENCES OF ASPIRATION. SLP 
ALSO REVIEWED RESULTS OF THERAPY AND RECOMMENDATIONS OF CONTINUED THERAPY 3-5 x WEEK. ALL 
QUESTIONS ANSWERED  AT THIS TIME. SLP COORDINATED WITH NURSE GHASSAN. 

-------------------------------------------------------------------------------

Addendum: 07/18/20 at 1812 [address] KAR WARREN

-------------------------------------------------------------------------------

Amended: Links added.

## 2020-07-19 VITALS — SYSTOLIC BLOOD PRESSURE: 135 MMHG | DIASTOLIC BLOOD PRESSURE: 51 MMHG

## 2020-07-19 VITALS — SYSTOLIC BLOOD PRESSURE: 128 MMHG | DIASTOLIC BLOOD PRESSURE: 48 MMHG

## 2020-07-19 VITALS — SYSTOLIC BLOOD PRESSURE: 132 MMHG | DIASTOLIC BLOOD PRESSURE: 63 MMHG

## 2020-07-19 VITALS — DIASTOLIC BLOOD PRESSURE: 68 MMHG | SYSTOLIC BLOOD PRESSURE: 135 MMHG

## 2020-07-19 VITALS — DIASTOLIC BLOOD PRESSURE: 54 MMHG | SYSTOLIC BLOOD PRESSURE: 140 MMHG

## 2020-07-19 VITALS — DIASTOLIC BLOOD PRESSURE: 74 MMHG | SYSTOLIC BLOOD PRESSURE: 136 MMHG

## 2020-07-19 LAB
ALBUMIN SERPL-MCNC: 1.3 G/DL (ref 3.5–5)
ALT SERPL-CCNC: 12 U/L (ref 12–78)
AST SERPL-CCNC: 16 U/L (ref 10–37)
BASOPHILS NFR BLD AUTO: 0.3 % (ref 0–5)
BILIRUB SERPL-MCNC: 0.5 MG/DL (ref 0.2–1)
BUN SERPL-MCNC: 16 MG/DL (ref 7–18)
CHLORIDE SERPL-SCNC: 103 MMOL/L (ref 101–111)
CO2 SERPL-SCNC: 30 MMOL/L (ref 21–32)
CREAT SERPL-MCNC: 0.7 MG/DL (ref 0.5–1.5)
EOSINOPHIL NFR BLD AUTO: 2.7 % (ref 0–8)
ERYTHROCYTE [DISTWIDTH] IN BLOOD BY AUTOMATED COUNT: 14.8 % (ref 11–15.5)
GFR SERPL CREATININE-BSD FRML MDRD: 85 ML/MIN (ref 60–?)
GLUCOSE SERPL-MCNC: 306 MG/DL (ref 70–105)
HCT VFR BLD AUTO: 24.9 % (ref 36–48)
LYMPHOCYTES NFR SPEC AUTO: 12.8 % (ref 21–51)
MCH RBC QN AUTO: 29.2 PG (ref 27–33)
MCHC RBC AUTO-ENTMCNC: 31.3 G/DL (ref 32–36)
MCV RBC AUTO: 93.3 FL (ref 79–99)
MONOCYTES NFR BLD AUTO: 8.8 % (ref 3–13)
NEUTROPHILS NFR BLD AUTO: 70.5 % (ref 40–77)
NRBC BLD MANUAL-RTO: 0.2 % (ref 0–0.19)
PLATELET # BLD AUTO: 274 K/UL (ref 130–400)
POTASSIUM SERPL-SCNC: 3.9 MMOL/L (ref 3.5–5.1)
PROT SERPL-MCNC: 5.2 G/DL (ref 6–8.3)
RBC # BLD AUTO: 2.67 MIL/UL (ref 4–5.5)
SODIUM SERPL-SCNC: 137 MMOL/L (ref 136–145)
WBC # BLD AUTO: 12.6 K/UL (ref 4.8–10.8)

## 2020-07-19 RX ADMIN — SODIUM CHLORIDE SCH GM: 9 INJECTION, SOLUTION INTRAVENOUS at 05:28

## 2020-07-19 RX ADMIN — VANCOMYCIN HYDROCHLORIDE SCH MLS/HR: 1 INJECTION, POWDER, LYOPHILIZED, FOR SOLUTION INTRAVENOUS at 12:59

## 2020-07-19 RX ADMIN — INSULIN GLARGINE SCH UNITS: 100 INJECTION, SOLUTION SUBCUTANEOUS at 09:00

## 2020-07-19 RX ADMIN — SODIUM CHLORIDE SCH UNIT: 9 INJECTION, SOLUTION INTRAVENOUS at 12:59

## 2020-07-19 RX ADMIN — SODIUM CHLORIDE SCH UNIT: 9 INJECTION, SOLUTION INTRAVENOUS at 06:30

## 2020-07-19 RX ADMIN — SODIUM CHLORIDE SCH GM: 9 INJECTION, SOLUTION INTRAVENOUS at 12:59

## 2020-07-19 RX ADMIN — SODIUM CHLORIDE SCH GM: 9 INJECTION, SOLUTION INTRAVENOUS at 21:18

## 2020-07-19 RX ADMIN — FAMOTIDINE SCH MG: 10 INJECTION INTRAVENOUS at 12:59

## 2020-07-19 RX ADMIN — VANCOMYCIN HYDROCHLORIDE SCH MLS/HR: 1 INJECTION, POWDER, LYOPHILIZED, FOR SOLUTION INTRAVENOUS at 21:20

## 2020-07-19 RX ADMIN — INSULIN GLARGINE SCH UNITS: 100 INJECTION, SOLUTION SUBCUTANEOUS at 21:22

## 2020-07-19 RX ADMIN — SODIUM CHLORIDE SCH UNIT: 9 INJECTION, SOLUTION INTRAVENOUS at 18:26

## 2020-07-19 RX ADMIN — Medication SCH MLS/HR: at 18:35

## 2020-07-19 RX ADMIN — SODIUM CHLORIDE SCH UNIT: 9 INJECTION, SOLUTION INTRAVENOUS at 21:21

## 2020-07-19 NOTE — NUR
DCP UPDATE:



Call and spoke w pt's dtr Chhaya regarding dcp. Informed her CM alert received regarding HH 
options for dc.. after much discussion pt's dtr mentioned that she has already contacted 
[facility] and is wanting to take pt home w hospice if that is an option. Informed her 
that this CM would call and speak to attending regarding this option. Per Chhaya they really 
want to try and get pt home. 



ASHER/PC consent obtained for [facility] phone [phone]. 



Order obtained for Hospice eval. 



Will ask CM/SW to followup in am w referral.

## 2020-07-19 NOTE — NUR
DYSPHAGIA TREATMENT COMPLETED



Pt LYING IN BED LETHARGIC AND UNRESPONSIVE TO VERBAL AND/OR TACTILE STIMULI. CARE TAKER AT 
BEDSIDE TO OBSERVE AND ASSIST DURING THERAPY. PATIENT WAS RAISED TO 90 DEGREES IN BED. SLP 
PROVIDED ORAL CARE, BUT PATIENT REMAINED UNRESPONSIVE WITH EYES CLOSED. PATIENT RESPONDED TO 
WET TOOTHETTE ON LIPS BY CLOSING HER MOUTH; NO BITING OR SUCKING TOOTHETTE RESPONSES 
PRESENT. PATIENT REMAINED WITH OPEN MOUTH POSTURE WITH SPOON IN MOUTH. PATIENT NOTED WITH 
DECREASED INTRA-ORAL SENSATION. PATIENT IS AT HIGH RISK OF ASPIRATION AT THIS TIME. NO 
FURTHER NECTAR THICK LIQUID TRIALS WERE PRESENTED. SLP EDUCATED CARE TAKER OF RISK AND 
CONSEQUENCES OF ASPIRATION. NG TUBE IN PLACE AT THIS TIME. SKILLED SPEECH THERAPY CONTINUED 
TO BE RECOMMENDED 3-5 X WEEK. SLP COORDINATED CARE WITH NURSE DAVID

-------------------------------------------------------------------------------

Addendum: 07/19/20 at 1645 [address] KAR WARREN

-------------------------------------------------------------------------------

Amended: Links added.

## 2020-07-19 NOTE — NUR
PT REPORT GIVEN TO VIRGINIA RN. PT STABLE, NO VISIBLE SIGNS OF DISTRESS. PT PROVIDER AT 
BEDSIDE IN ROOM. PT RESTING IN BED, VITALS WNL, IV PATENT TO LT JUGULAR.

## 2020-07-20 VITALS — DIASTOLIC BLOOD PRESSURE: 67 MMHG | SYSTOLIC BLOOD PRESSURE: 144 MMHG

## 2020-07-20 VITALS — SYSTOLIC BLOOD PRESSURE: 148 MMHG | DIASTOLIC BLOOD PRESSURE: 69 MMHG

## 2020-07-20 VITALS — DIASTOLIC BLOOD PRESSURE: 62 MMHG | SYSTOLIC BLOOD PRESSURE: 145 MMHG

## 2020-07-20 VITALS — SYSTOLIC BLOOD PRESSURE: 143 MMHG | DIASTOLIC BLOOD PRESSURE: 96 MMHG

## 2020-07-20 VITALS — SYSTOLIC BLOOD PRESSURE: 141 MMHG | DIASTOLIC BLOOD PRESSURE: 69 MMHG

## 2020-07-20 VITALS — SYSTOLIC BLOOD PRESSURE: 134 MMHG | DIASTOLIC BLOOD PRESSURE: 62 MMHG

## 2020-07-20 LAB
ALBUMIN SERPL-MCNC: 1.4 G/DL (ref 3.5–5)
ALT SERPL-CCNC: 12 U/L (ref 12–78)
AST SERPL-CCNC: 15 U/L (ref 10–37)
BASOPHILS NFR BLD AUTO: 0.4 % (ref 0–5)
BILIRUB SERPL-MCNC: 0.4 MG/DL (ref 0.2–1)
BUN SERPL-MCNC: 18 MG/DL (ref 7–18)
CHLORIDE SERPL-SCNC: 104 MMOL/L (ref 101–111)
CO2 SERPL-SCNC: 33 MMOL/L (ref 21–32)
CREAT SERPL-MCNC: 0.6 MG/DL (ref 0.5–1.5)
EOSINOPHIL NFR BLD AUTO: 2.4 % (ref 0–8)
ERYTHROCYTE [DISTWIDTH] IN BLOOD BY AUTOMATED COUNT: 14.9 % (ref 11–15.5)
GFR SERPL CREATININE-BSD FRML MDRD: 102 ML/MIN (ref 60–?)
GLUCOSE SERPL-MCNC: 253 MG/DL (ref 70–105)
HCT VFR BLD AUTO: 27 % (ref 36–48)
LYMPHOCYTES NFR SPEC AUTO: 12.5 % (ref 21–51)
MCH RBC QN AUTO: 29.1 PG (ref 27–33)
MCHC RBC AUTO-ENTMCNC: 30.7 G/DL (ref 32–36)
MCV RBC AUTO: 94.7 FL (ref 79–99)
MONOCYTES NFR BLD AUTO: 9.8 % (ref 3–13)
NEUTROPHILS NFR BLD AUTO: 70.1 % (ref 40–77)
NRBC BLD MANUAL-RTO: 0.2 % (ref 0–0.19)
PLATELET # BLD AUTO: 322 K/UL (ref 130–400)
POTASSIUM SERPL-SCNC: 4.1 MMOL/L (ref 3.5–5.1)
PROT SERPL-MCNC: 5.6 G/DL (ref 6–8.3)
RBC # BLD AUTO: 2.85 MIL/UL (ref 4–5.5)
SODIUM SERPL-SCNC: 141 MMOL/L (ref 136–145)
WBC # BLD AUTO: 13.1 K/UL (ref 4.8–10.8)

## 2020-07-20 RX ADMIN — SODIUM CHLORIDE SCH UNIT: 9 INJECTION, SOLUTION INTRAVENOUS at 06:12

## 2020-07-20 RX ADMIN — SODIUM CHLORIDE SCH GM: 9 INJECTION, SOLUTION INTRAVENOUS at 05:28

## 2020-07-20 RX ADMIN — Medication SCH MLS/HR: at 20:47

## 2020-07-20 RX ADMIN — SODIUM CHLORIDE SCH GM: 9 INJECTION, SOLUTION INTRAVENOUS at 20:47

## 2020-07-20 RX ADMIN — SODIUM CHLORIDE SCH UNIT: 9 INJECTION, SOLUTION INTRAVENOUS at 17:16

## 2020-07-20 RX ADMIN — SODIUM CHLORIDE SCH GM: 9 INJECTION, SOLUTION INTRAVENOUS at 11:47

## 2020-07-20 RX ADMIN — FAMOTIDINE SCH MG: 10 INJECTION INTRAVENOUS at 09:27

## 2020-07-20 RX ADMIN — SODIUM CHLORIDE SCH UNIT: 9 INJECTION, SOLUTION INTRAVENOUS at 11:55

## 2020-07-20 RX ADMIN — INSULIN GLARGINE SCH UNITS: 100 INJECTION, SOLUTION SUBCUTANEOUS at 20:50

## 2020-07-20 NOTE — NUR
TREATMENT COMPLETED. 



Pt'S DAUGHTER BLAINE AT BEDSIDE AT THE TIME OF THE SESSION. Pt RAISED TO 90 DEGREES IN BED 
DURING THE SESSION. SLP PROVIDED ORAL CARE. Pt PARTICIPATED IN THERAPEUTIC TRIALS OF 
ADVANCED TRIALS OF THIN LIQUIDS VIA TSP X2 WITH DECREASED BOLUS CONTROL WITH POOR LABIAL 
SEAL; ANTERIOR SPILLAGE NOTED, +S/S OF ASPIRATION OF COUGH RESPONSE. Pt PROVIDED WITH TRIALS 
OF NECTAR-THICK LIQUIDS X6 WITH IMPROVED SWALLOW TRIGGER AND NO OVERT S/S OF ASPIRATION.  Pt 
PARTICIPATED IN TRIALS OF PUREED TEXTURE X3 WITH IMPROVED BOLUS FORMATION AND MANIPULATION. 
Pt ABLE TO CLEAR SOLIDS FROM ORAL CAVITY  SLP EDUCATED DAUGHTER ON RISKS AND CONSEQUENCES OF 
ASPIRATION. NG TUBE IN PLACE AT THIS TIME. SKILLED SPEECH THERAPY CONTINUED TO BE 
RECOMMENDED 3-5X WEEK. SLP COORDINATED CARE WITH NURSE VÁSQUEZ. DAUGHTER PROVIDED WITH 
THICKENER AND THICKENING INSTRUCTIONS FOR THE HOME. Pt TO BE ON PUREED, NECTAR-THICK LIQUID 
DIET AS SHE IS DISCHARGING HOSPICE AS PER DAUGHTER. SLP REITERATED HIGH RISK FOR ASPIRATION. 
SHE VERBALIZED UNDERSTANDING. 

-------------------------------------------------------------------------------

Addendum: 07/20/20 at 1259 by MARIA C ELIAS, JAMISON ST

-------------------------------------------------------------------------------

Amended: Links added.

## 2020-07-20 NOTE — NUR
F/F w/dtr-re OOHDNR

SW met w/pt's dtr. to discuss and provide education on OOHDNR. Dtr. not willing to sign 
OOHDNR until she has spoken with her father f/f and will do so late this afternoon when she 
visits him. Dtr. stated that she will return to bedside this evening and will sign OOHDNR at 
that time. This worker explained all signatures needed in order for OOHDNR to be valid; dtr. 
voiced an understanding. SW informed BARRERA Retana of above.

## 2020-07-20 NOTE — NUR
Received an order for Physical Therapy Evaluation. Spoke to Sunshine, patient was 
accepted to Hospice and may DC to home tomorrow via ambulance and DME will be delivered to 
pt's residence.Per Sunshine  to disregard Physical Therapy Evaluation.BARRERA Retana 
notified that patient is already accepted to Hospice.

-------------------------------------------------------------------------------

Addendum: 07/20/20 at 1501 by MARCELL AVALOS, PT PT

-------------------------------------------------------------------------------

Amended: Links added.

## 2020-07-20 NOTE — NUR
OOHDNR

SW spoke with pt's dtr. Chhaya who stated she'd already spoken with her father about OOHDNR 
and she was ready to proceed with signing it. Pt's dtr. signed document with witnesses 
present. LIS placed OOHDNR for MD signature. BARRERA Retana made aware. CM also made aware.

## 2020-07-20 NOTE — NUR
[facility]

T/C from Luciano/Liaaiyana stating that pt. is accepted and requesting order to admit.

SW informed Luciano that OOHDNR is pending dtr. to sign this evening.

MANDY Gonsalez made aware.

## 2020-07-20 NOTE — NUR
MANDY Note: EMS arranged for tomorrow

As per LIS Anderson pt has acceptance for [facility], pending DME to be delivered. 
Daughter Chhaya request dcp for tomorrow morning. EMS arranged and faxed for tomorrow, 
primary nurse to call [facility] once pt ready to DC. Primary nurse Lainey dempsey. LIS and CM to cont 
to follow up.

## 2020-07-20 NOTE — NUR
MEDS

SHIFT ASSESSMENT DONE, PLEASE REFER TO CHART. DUE MEDS ADMINISTERED, TOLERATED WELL. KEPT 
RESTED AND COMFORTABLE. PT'S DAUGHTER AT BEDSIDE.

## 2020-07-21 VITALS — SYSTOLIC BLOOD PRESSURE: 124 MMHG | DIASTOLIC BLOOD PRESSURE: 59 MMHG

## 2020-07-21 VITALS — SYSTOLIC BLOOD PRESSURE: 146 MMHG | DIASTOLIC BLOOD PRESSURE: 67 MMHG

## 2020-07-21 VITALS — DIASTOLIC BLOOD PRESSURE: 69 MMHG | SYSTOLIC BLOOD PRESSURE: 128 MMHG

## 2020-07-21 VITALS — DIASTOLIC BLOOD PRESSURE: 78 MMHG | SYSTOLIC BLOOD PRESSURE: 148 MMHG

## 2020-07-21 LAB
ALBUMIN SERPL-MCNC: 1.4 G/DL (ref 3.5–5)
ALT SERPL-CCNC: 7 U/L (ref 12–78)
AST SERPL-CCNC: 13 U/L (ref 10–37)
BASOPHILS NFR BLD AUTO: 0.3 % (ref 0–5)
BILIRUB SERPL-MCNC: 0.5 MG/DL (ref 0.2–1)
BUN SERPL-MCNC: 17 MG/DL (ref 7–18)
CHLORIDE SERPL-SCNC: 100 MMOL/L (ref 101–111)
CO2 SERPL-SCNC: 33 MMOL/L (ref 21–32)
CREAT SERPL-MCNC: 0.7 MG/DL (ref 0.5–1.5)
EOSINOPHIL NFR BLD AUTO: 2.4 % (ref 0–8)
ERYTHROCYTE [DISTWIDTH] IN BLOOD BY AUTOMATED COUNT: 14.6 % (ref 11–15.5)
GFR SERPL CREATININE-BSD FRML MDRD: 85 ML/MIN (ref 60–?)
GLUCOSE SERPL-MCNC: 260 MG/DL (ref 70–105)
HCT VFR BLD AUTO: 27.6 % (ref 36–48)
LYMPHOCYTES NFR SPEC AUTO: 12.1 % (ref 21–51)
MCH RBC QN AUTO: 29.1 PG (ref 27–33)
MCHC RBC AUTO-ENTMCNC: 30.8 G/DL (ref 32–36)
MCV RBC AUTO: 94.5 FL (ref 79–99)
MONOCYTES NFR BLD AUTO: 10.6 % (ref 3–13)
NEUTROPHILS NFR BLD AUTO: 70.9 % (ref 40–77)
NRBC BLD MANUAL-RTO: 0 % (ref 0–0.19)
PLATELET # BLD AUTO: 352 K/UL (ref 130–400)
POTASSIUM SERPL-SCNC: 4.3 MMOL/L (ref 3.5–5.1)
PROT SERPL-MCNC: 5.7 G/DL (ref 6–8.3)
RBC # BLD AUTO: 2.92 MIL/UL (ref 4–5.5)
RBC MORPH BLD: (no result)
SODIUM SERPL-SCNC: 137 MMOL/L (ref 136–145)
WBC # BLD AUTO: 12.7 K/UL (ref 4.8–10.8)

## 2020-07-21 RX ADMIN — SODIUM CHLORIDE SCH UNIT: 9 INJECTION, SOLUTION INTRAVENOUS at 11:51

## 2020-07-21 RX ADMIN — SODIUM CHLORIDE SCH GM: 9 INJECTION, SOLUTION INTRAVENOUS at 11:42

## 2020-07-21 RX ADMIN — SODIUM CHLORIDE SCH GM: 9 INJECTION, SOLUTION INTRAVENOUS at 04:25

## 2020-07-21 RX ADMIN — FAMOTIDINE SCH MG: 10 INJECTION INTRAVENOUS at 08:24

## 2020-07-21 RX ADMIN — INSULIN GLARGINE SCH UNITS: 100 INJECTION, SOLUTION SUBCUTANEOUS at 08:26

## 2020-07-21 RX ADMIN — SODIUM CHLORIDE SCH UNIT: 9 INJECTION, SOLUTION INTRAVENOUS at 06:14

## 2020-07-21 RX ADMIN — SODIUM CHLORIDE SCH UNIT: 9 INJECTION, SOLUTION INTRAVENOUS at 00:18

## 2020-07-21 NOTE — NUR
ROUNDS

PT RESTING WELL. NO DISTRESS NOTED. KEPT RESTED AND COMFORTABLE. WILL MONITOR PT. FAMILY 
ASLEEP AT BEDSIDE.

## 2020-07-21 NOTE — NUR
Physical Therapist spoke to patient's care giver Allison regarding patient is not safe to be 
transferred to a recliner wheelchair secondary to pt. on NG tube - precaution aspiration and 
patient is on right knee immobilizer.No skilled Physical Therapy Evaluation indicated.Pt. is 
schedule to be DC via ambulance later today and was accepted to [facility],pending DME 
deliver as per Sunshine.Notified  BARRERA Retana

-------------------------------------------------------------------------------

Addendum: 07/21/20 at 0958 by MARCELL AVALOS PT PT

-------------------------------------------------------------------------------

Amended: Links added.

## 2020-07-21 NOTE — NUR
EMS

Pt transport here. Pt discharged in good condition accompanied by sitter and EMS staff.  All 
belongings with pt.

## 2020-07-21 NOTE — NUR
STOOL

PCP IN TO DO V/S, STABLE. DIAPER CHANGED, STOOL COLLECTED FOR LAB THEN SENT FOR OCCULT 
BLOOD. INSULIN DOSE ADMINISTERED, TOLERATED WELL. WATER FLUSHES ADMINISTERED VIA NGT. ORAL 
CARE DONE.  KEPT COMFORTABLE WITH HOB ELEVATED. PT'S DAUGHTER ASLEEP AT BEDSIDE. WILL 
CONTINUE TO MONITOR.

## 2020-07-21 NOTE — NUR
MEDS

PT RESTING WELL. NO CHANGE IN PT'S CONDITION. DUE MEDS ADMINISTERED, TOLERATED WELL. NEW 
FEEDING BOTTLE HUNG WITH NEW TUBINGS. KEPT COMFORTABLE WITH HOB ELEVATED. FOR MORE CARE.